# Patient Record
Sex: MALE | Race: WHITE | NOT HISPANIC OR LATINO
[De-identification: names, ages, dates, MRNs, and addresses within clinical notes are randomized per-mention and may not be internally consistent; named-entity substitution may affect disease eponyms.]

---

## 2018-01-05 ENCOUNTER — APPOINTMENT (OUTPATIENT)
Dept: ORTHOPEDIC SURGERY | Facility: CLINIC | Age: 73
End: 2018-01-05

## 2022-04-04 ENCOUNTER — RESULT REVIEW (OUTPATIENT)
Age: 77
End: 2022-04-04

## 2022-04-19 ENCOUNTER — APPOINTMENT (OUTPATIENT)
Dept: PAIN MANAGEMENT | Facility: CLINIC | Age: 77
End: 2022-04-19

## 2022-04-21 ENCOUNTER — APPOINTMENT (OUTPATIENT)
Dept: PAIN MANAGEMENT | Facility: CLINIC | Age: 77
End: 2022-04-21
Payer: MEDICARE

## 2022-04-21 VITALS — HEIGHT: 67 IN | BODY MASS INDEX: 32.96 KG/M2 | WEIGHT: 210 LBS

## 2022-04-21 DIAGNOSIS — M48.061 SPINAL STENOSIS, LUMBAR REGION WITHOUT NEUROGENIC CLAUDICATION: ICD-10-CM

## 2022-04-21 DIAGNOSIS — M51.26 OTHER INTERVERTEBRAL DISC DISPLACEMENT, LUMBAR REGION: ICD-10-CM

## 2022-04-21 DIAGNOSIS — Z86.79 PERSONAL HISTORY OF OTHER DISEASES OF THE CIRCULATORY SYSTEM: ICD-10-CM

## 2022-04-21 DIAGNOSIS — Z86.39 PERSONAL HISTORY OF OTHER ENDOCRINE, NUTRITIONAL AND METABOLIC DISEASE: ICD-10-CM

## 2022-04-21 PROCEDURE — 99204 OFFICE O/P NEW MOD 45 MIN: CPT

## 2022-04-21 RX ORDER — LANCETS 33 GAUGE
EACH MISCELLANEOUS
Qty: 200 | Refills: 0 | Status: ACTIVE | COMMUNITY
Start: 2021-02-22

## 2022-04-21 RX ORDER — ROSUVASTATIN CALCIUM 5 MG/1
5 TABLET, FILM COATED ORAL
Qty: 90 | Refills: 0 | Status: ACTIVE | COMMUNITY
Start: 2021-12-21

## 2022-04-21 RX ORDER — METOPROLOL SUCCINATE 100 MG/1
100 TABLET, EXTENDED RELEASE ORAL
Qty: 60 | Refills: 0 | Status: ACTIVE | COMMUNITY
Start: 2021-12-21

## 2022-04-21 RX ORDER — BLOOD SUGAR DIAGNOSTIC
STRIP MISCELLANEOUS
Qty: 300 | Refills: 0 | Status: ACTIVE | COMMUNITY
Start: 2021-02-22

## 2022-04-21 NOTE — HISTORY OF PRESENT ILLNESS
[Lower back] : lower back [8] : 8 [5] : 5 [Sharp] : sharp [Constant] : constant [Nothing helps with pain getting better] : Nothing helps with pain getting better [Standing] : standing [Lying in bed] : lying in bed [FreeTextEntry1] : 04/21/2022 : Patient presents for initial evaluation. He has pain in the right lower back to the right lateral thigh. Had a right THR over 20 years ago and initially went to Dr. Fonseca and the hip looked good. Has issues with the left leg since he was a child. can not bend the left knee and requires a shoe lift. no n/t. \par \par Subjective Weakness: Yes\par Numbness/Tingling: No\par Bladder/Bowel dysfunction:No\par Physical Therapy: 5 sessions with limited relief. \par \par \par Attempted modalities for current pain complaint:\par See above:\par Medications: No\par \par Injections: Yes-->unsure of the type 7-8 years ago. with improvement. \par \par Previous Spine Surgery: N/A\par \par Imaging:\par MRI Lumbar Spine (4/4/21): ZP \par T12-L1: No disc protrusion. There is no neural foraminal narrowing. There is no\par central canal stenosis. There is no facet joint arthrosis.\par L1-L2: No disc protrusion. There is no neural foraminal narrowing. There is no\par central canal stenosis. There is no facet joint arthrosis.\par L2-L3: No disc protrusion. There is no neural foraminal narrowing. There is no\par central canal stenosis. There is no facet joint arthrosis.\par L3-L4: There is a 3 to 4 mm circumferential disc bulge. There is left neural\par foraminal narrowing. There is no central canal stenosis. There is no facet joint\par arthrosis.\par L4-L5: There is a 3 to 4 mm circumferential disc bulge. There is bilateral\par neural foraminal narrowing. There is spinal canal stenosis measuring 8 mm in AP\par dimension. There is bilateral facet joint arthrosis.\par L5-S1: There is a 5 mm broad-based posterior right lateral zone disc protrusion.\par There is right neural foraminal narrowing. There is spinal canal stenosis\par measuring less than 9 mm in AP dimension. There is bilateral facet joint\par arthrosis. There is a 0.9 cm synovial cyst extending medially from the right\par facet joint with encroachment on the right transiting S1 nerve roots.\par There is a transitional lumbosacral vertebrae with lumbarization of the S1\par vertebral body.\par \par \par \par \par  [] : no [FreeTextEntry7] : right hip

## 2022-04-21 NOTE — PHYSICAL EXAM
[] : motor exam is non-focal throughout both lower extremities [TWNoteComboBox7] : forward flexion 60 degrees

## 2022-04-21 NOTE — ASSESSMENT
[FreeTextEntry1] : After discussing various treatment options with the patient including but not limited to oral medications, physical therapy, exercise, modalities as well as interventional spinal injections, we have decided with the following plan:\par I personally reviewed the MRI/CT scan images and agree with the radiologist's report. The radiological findings were discussed with the patient.\par The risks, benefits, contents and alternatives to injection were explained in full to the patient. Risks outlined include but are not limited to infection,sepsis, bleeding, post-dural puncture headache, nerve damage, temporary increase in pain, syncopal episode, failure to resolve symptoms, allergic reaction, symptom recurrence, and elevation of blood sugar in diabetics. Cortisone may cause immunosuppression. Patient understands the risks. All questions were answered. After discussion of options, patient requested an injection. Information regarding the injection was given to the patient. Which medications to stop prior to the injection was explained to the patient as well.\par Follow up in 1-2 weeks post injection for re-evaluation. \par Continue Home exercises, stretching, activity modification, physical therapy, and conservative care.\par Patient is presenting with acute/sub-acute radicular pain with impairment in ADLs and functionality. The pain has not responded to conservative care including nsaid therapy and/or physical therapy. There is no bleeding tendency, unstable medical condition, or systemic infection.\par \par Patient is presenting with acute/sub-acute radicular pain with impairment in ADLs and functionality.  The pain has not responded to  conservative care including nsaid therapy and/or physical therapy.  There is no bleeding tendency, unstable medical condition, or systemic infection.\par \par \par

## 2022-04-21 NOTE — DATA REVIEWED
[MRI] : MRI [Lumbar Spine] : lumbar spine [Report was reviewed and noted in the chart] : The report was reviewed and noted in the chart [I independently reviewed and interpreted images and report] : I independently reviewed and interpreted images and report

## 2022-04-27 ENCOUNTER — TRANSCRIPTION ENCOUNTER (OUTPATIENT)
Age: 77
End: 2022-04-27

## 2022-05-02 ENCOUNTER — APPOINTMENT (OUTPATIENT)
Dept: PAIN MANAGEMENT | Facility: CLINIC | Age: 77
End: 2022-05-02
Payer: MEDICARE

## 2022-05-02 DIAGNOSIS — M54.16 RADICULOPATHY, LUMBAR REGION: ICD-10-CM

## 2022-05-02 PROCEDURE — 64483 NJX AA&/STRD TFRM EPI L/S 1: CPT | Mod: RT

## 2022-05-02 PROCEDURE — 64484 NJX AA&/STRD TFRM EPI L/S EA: CPT | Mod: RT

## 2022-05-02 NOTE — PROCEDURE
[FreeTextEntry3] : Date of Service: 05/02/2022 \par \par Account: [id number]\par \par Patient: JAYNE MCLEOD \par \par YOB: 1945\par \par Age: 76 year\par \par \par Surgeon: Adriana Ray M.D.\par \par Assistant: None.\par \par Pre-Operative Diagnosis: Lumbosacral Radiculitis (M54.17)\par \par Post Operative Diagnosis: Lumbosacral Radiculitis (M54.17)\par \par Procedure: Right L4-5, L5-S1 transforaminal epidural steroid injection under fluoroscopic guidance.\par \par Anesthesia:             MAC\par \par \par This procedure was carried out using fluoroscopic guidance.  The risks and benefits of the procedure were discussed extensively with the patient.  The consent of the patient was obtained and the following procedure was performed. The patient was placed in the prone position on the fluoroscopic table and the lumbar area was prepped and draped in a sterile fashion.\par \par The right L4-5 and L5-S1 neural foramen were identified on right oblique  "kvng dog" anatomical view at the 6 o' clock position using fluoroscopic guidance, and the area was marked. The overlying skin and subcutaneous structures were anesthetized using sterile technique with 1% Lidocaine.  A 22 gauge spinal needle was directed toward the inferior (6o'clock) position of the pedicle, which formed the roof of the identified foramen.  Once in the epidural space, after negative aspiration for heme and CSF, 1cc of Omnipaque contrast was injected to confirm epidural location and assess filling defects and rule out intravascular needle placement. \par \par The following contrast flow and epidurogram was observed: no intravascular or intrathecal flow pattern was noted.  No blood or CSF was aspirated. Omnipaque spread appeared to outline the right L4 and L5 nerve roots and spread medially into the epidural space.  \par \par After this, an injectate of 3 cc preservative free normal saline plus 40 mg of Kenalog was injected in the epidural space at each of the two levels.\par \par The needle was subsequently removed.  Vital signs remained normal.  Pulse oximeter was used throughout the procedure and the patient's pulse and oxygen saturation remained within normal limits.  The patient tolerated the procedure well.  There were no complications.  The patient was instructed to apply ice over the injection sites for twenty minutes every two hours for the next 24 to 48 hours.  The patient was also instructed to contact me immediately if there were any problems.\par \par Adriana Ray M.D.\par \par \par

## 2022-05-25 ENCOUNTER — APPOINTMENT (OUTPATIENT)
Dept: PAIN MANAGEMENT | Facility: CLINIC | Age: 77
End: 2022-05-25

## 2022-06-01 ENCOUNTER — APPOINTMENT (OUTPATIENT)
Dept: PAIN MANAGEMENT | Facility: CLINIC | Age: 77
End: 2022-06-01
Payer: MEDICARE

## 2022-06-01 VITALS — WEIGHT: 229 LBS | BODY MASS INDEX: 35.94 KG/M2 | HEIGHT: 67 IN

## 2022-06-01 PROCEDURE — 99214 OFFICE O/P EST MOD 30 MIN: CPT

## 2022-06-01 NOTE — HISTORY OF PRESENT ILLNESS
[Lower back] : lower back [8] : 8 [5] : 5 [Sharp] : sharp [Standing] : standing [Lying in bed] : lying in bed [Intermittent] : intermittent [Injection therapy] : injection therapy [Walking] : walking [] : no [FreeTextEntry1] : hip [FreeTextEntry7] : right hip

## 2022-08-22 ENCOUNTER — APPOINTMENT (OUTPATIENT)
Dept: PAIN MANAGEMENT | Facility: CLINIC | Age: 77
End: 2022-08-22

## 2022-08-22 PROCEDURE — 82948 REAGENT STRIP/BLOOD GLUCOSE: CPT

## 2022-08-22 PROCEDURE — 64484 NJX AA&/STRD TFRM EPI L/S EA: CPT | Mod: RT

## 2022-08-22 PROCEDURE — 64483 NJX AA&/STRD TFRM EPI L/S 1: CPT | Mod: RT

## 2022-08-22 NOTE — PROCEDURE
[FreeTextEntry3] : Date of Service: 08/22/2022 \par \par Account: [id number]\par \par Patient: JAYNE MCLEOD \par \par YOB: 1945\par \par Age: 77 year\par \par \par Surgeon: Rohan Vargas M.D.\par \par Assistant: None.\par \par Pre-Operative Diagnosis: Lumbosacral radiculitis\par \par Post Operative Diagnosis: Lumbosacral radiculitis\par \par Procedure: Right L5-S1, S1 transforaminal epidural steroid injection under fluoroscopic guidance. \par \par Anesthesia:      MAC\par \par \par This procedure was carried out using fluoroscopic guidance.  The risks and benefits of the procedure were discussed extensively with the patient.  The consent of the patient was obtained and the following procedure was performed. The patient was placed in the prone position on the fluoroscopic table and the lumbar area was prepped and draped in a sterile fashion.\par \par The right L5-S1 neural foramen was then identified on right oblique  "kvng dog" anatomical view at the 6 o' clock position using fluoroscopic guidance, and the area was marked. The overlying skin and subcutaneous structures were anesthetized using sterile technique with 1% Lidocaine.  A 22 gauge spinal needle was directed toward the inferior (6 o'clock) position of the pedicle, which formed the roof of the identified foramen.  Once in the epidural space, after negative aspiration for heme and CSF, 1cc of Omnipaque contrast was injected to confirm epidural location and assess filling defects and rule out intravascular needle placement. \par \par The following contrast flow and epidurogram was observed: no intravascular or intrathecal flow pattern was noted.  No blood or CSF was aspirated. Omnipaque spread appeared to outline the right L5 nerve root and spread medially into the epidural space.  \par \par After this, an injectate of 3 cc preservative free normal saline plus 40 mg of kenalog was injected in the epidural space.\par \par The right S1 neural foramen was then identified on right oblique anatomical view at the 6 o' clock position of the S1 pedicle using fluoroscopic guidance, and the area was marked. The overlying skin and subcutaneous structures were anesthetized using sterile technique with 1% Lidocaine.  A 22 gauge spinal needle was directed toward the inferior (6 o'clock) position of the pedicle, which formed the roof of the identified foramen.  Once in the epidural space, after negative aspiration for heme and CSF, 1cc of Omnipaque contrast was injected to confirm epidural location and assess filling defects and rule out intravascular needle placement. \par \par The following contrast flow and epidurogram was observed: no intravascular or intrathecal flow pattern was noted.  No blood or CSF was aspirated. Omnipaque spread appeared to outline the right S1 nerve root and spread medially into the epidural space.  \par \par After this, an injectate of 3 cc preservative free normal saline plus 40 mg of kenalog was injected in the epidural space.\par \par The needle was subsequently removed.  Vital signs remained normal.  Pulse oximeter was used throughout the procedure and the patient's pulse and oxygen saturation remained within normal limits.  The patient tolerated the procedure well.  There were no complications.  The patient was instructed to apply ice over the injection sites for twenty minutes every two hours for the next 24 to 48 hours.  The patient was also instructed to contact me immediately if there were any problems.\par \par \par Rohan Vargas M.D.\par \par

## 2022-09-07 ENCOUNTER — APPOINTMENT (OUTPATIENT)
Dept: PAIN MANAGEMENT | Facility: CLINIC | Age: 77
End: 2022-09-07

## 2022-09-19 ENCOUNTER — APPOINTMENT (OUTPATIENT)
Dept: PAIN MANAGEMENT | Facility: CLINIC | Age: 77
End: 2022-09-19

## 2022-09-19 VITALS — HEIGHT: 67 IN | BODY MASS INDEX: 35.94 KG/M2 | WEIGHT: 229 LBS

## 2022-09-19 PROCEDURE — 99213 OFFICE O/P EST LOW 20 MIN: CPT

## 2022-09-19 NOTE — DISCUSSION/SUMMARY
[de-identified] : 90% relief  of radicular pain pp with improved ROM and ADLS \par some  pain in  lower back only when he goes to stand up /sit down \par declines TPI today will hold off and fu PRN\par

## 2022-09-19 NOTE — HISTORY OF PRESENT ILLNESS
[Lower back] : lower back [7] : 7 [3] : 3 [Dull/Aching] : dull/aching [Intermittent] : intermittent [Household chores] : household chores [Leisure] : leisure [Injection therapy] : injection therapy [Sitting] : sitting [] : no

## 2022-09-19 NOTE — PHYSICAL EXAM
[Flexion] : flexion [Extension] : extension [Bending to left] : bending to left [Bending to right] : bending to right

## 2022-09-19 NOTE — REASON FOR VISIT
[FreeTextEntry2] : f/u to injection \par Procedure: Right L5-S1,S1 transforaminal epidural steroid injection under fluoroscopic guidance \par Anesthesia:MAC(DOS:08/22/2022)

## 2022-11-01 ENCOUNTER — ASC (OUTPATIENT)
Dept: URBAN - METROPOLITAN AREA SURGERY 8 | Facility: SURGERY | Age: 77
Setting detail: OPHTHALMOLOGY
End: 2022-11-01
Payer: COMMERCIAL

## 2022-11-01 DIAGNOSIS — E11.3511: ICD-10-CM

## 2022-11-01 PROCEDURE — 67210 TREATMENT OF RETINAL LESION: CPT | Performed by: OPHTHALMOLOGY

## 2022-11-01 ASSESSMENT — TONOMETRY
OD_IOP_MMHG: 17
OS_IOP_MMHG: 16

## 2022-11-02 ASSESSMENT — REFRACTION_AUTOREFRACTION
OS_CYLINDER: -2.00
OD_SPHERE: UNABLE
OS_SPHERE: +1.00
OS_AXIS: 98

## 2022-11-02 ASSESSMENT — SPHEQUIV_DERIVED: OS_SPHEQUIV: 0

## 2022-11-02 ASSESSMENT — VISUAL ACUITY
OD_BCVA: 20/25-1
OS_BCVA: 20/50+1

## 2022-12-13 ENCOUNTER — OFFICE (OUTPATIENT)
Dept: URBAN - METROPOLITAN AREA CLINIC 94 | Facility: CLINIC | Age: 77
Setting detail: OPHTHALMOLOGY
End: 2022-12-13
Payer: COMMERCIAL

## 2022-12-13 DIAGNOSIS — E11.3513: ICD-10-CM

## 2022-12-13 DIAGNOSIS — H35.341: ICD-10-CM

## 2022-12-13 DIAGNOSIS — H43.11: ICD-10-CM

## 2022-12-13 DIAGNOSIS — H43.391: ICD-10-CM

## 2022-12-13 DIAGNOSIS — H35.3112: ICD-10-CM

## 2022-12-13 PROCEDURE — 99024 POSTOP FOLLOW-UP VISIT: CPT | Performed by: OPHTHALMOLOGY

## 2022-12-13 PROCEDURE — 92134 CPTRZ OPH DX IMG PST SGM RTA: CPT | Performed by: OPHTHALMOLOGY

## 2022-12-13 ASSESSMENT — REFRACTION_AUTOREFRACTION
OS_SPHERE: +1.00
OS_AXIS: 98
OS_CYLINDER: -2.00
OD_SPHERE: UNABLE

## 2022-12-13 ASSESSMENT — VISUAL ACUITY
OS_BCVA: 20/50
OD_BCVA: 20/25-1

## 2022-12-13 ASSESSMENT — SPHEQUIV_DERIVED: OS_SPHEQUIV: 0

## 2022-12-13 ASSESSMENT — CONFRONTATIONAL VISUAL FIELD TEST (CVF)
OS_FINDINGS: FULL
OD_FINDINGS: FULL

## 2022-12-13 ASSESSMENT — TONOMETRY
OD_IOP_MMHG: 15
OS_IOP_MMHG: 15

## 2023-01-17 ENCOUNTER — ASC (OUTPATIENT)
Dept: URBAN - METROPOLITAN AREA SURGERY 8 | Facility: SURGERY | Age: 78
Setting detail: OPHTHALMOLOGY
End: 2023-01-17
Payer: COMMERCIAL

## 2023-01-17 DIAGNOSIS — E11.3312: ICD-10-CM

## 2023-01-17 PROCEDURE — 67210 TREATMENT OF RETINAL LESION: CPT | Performed by: OPHTHALMOLOGY

## 2023-01-17 ASSESSMENT — REFRACTION_AUTOREFRACTION
OS_CYLINDER: -2.00
OD_SPHERE: UNABLE
OS_SPHERE: +1.00
OS_AXIS: 98

## 2023-01-17 ASSESSMENT — SPHEQUIV_DERIVED: OS_SPHEQUIV: 0

## 2023-01-17 ASSESSMENT — CONFRONTATIONAL VISUAL FIELD TEST (CVF)
OD_FINDINGS: FULL
OS_FINDINGS: FULL

## 2023-01-17 ASSESSMENT — TONOMETRY
OD_IOP_MMHG: 16
OS_IOP_MMHG: 14

## 2023-01-17 ASSESSMENT — VISUAL ACUITY
OS_BCVA: 20/40
OD_BCVA: 20/25+1

## 2023-03-28 ENCOUNTER — ASC (OUTPATIENT)
Dept: URBAN - METROPOLITAN AREA SURGERY 8 | Facility: SURGERY | Age: 78
Setting detail: OPHTHALMOLOGY
End: 2023-03-28
Payer: COMMERCIAL

## 2023-03-28 DIAGNOSIS — E11.3511: ICD-10-CM

## 2023-03-28 PROCEDURE — 67210 TREATMENT OF RETINAL LESION: CPT | Performed by: OPHTHALMOLOGY

## 2023-03-28 ASSESSMENT — CONFRONTATIONAL VISUAL FIELD TEST (CVF)
OD_FINDINGS: FULL
OS_FINDINGS: FULL

## 2023-03-28 ASSESSMENT — TONOMETRY
OS_IOP_MMHG: 14
OD_IOP_MMHG: 15

## 2023-03-28 ASSESSMENT — VISUAL ACUITY
OD_BCVA: 20/25-
OS_BCVA: 20/50

## 2023-03-28 ASSESSMENT — REFRACTION_AUTOREFRACTION
OD_SPHERE: UNABLE
OS_CYLINDER: -2.00
OS_AXIS: 98
OS_SPHERE: +1.00

## 2023-03-28 ASSESSMENT — SPHEQUIV_DERIVED: OS_SPHEQUIV: 0

## 2023-05-04 ENCOUNTER — OFFICE (OUTPATIENT)
Dept: URBAN - METROPOLITAN AREA CLINIC 104 | Facility: CLINIC | Age: 78
Setting detail: OPHTHALMOLOGY
End: 2023-05-04
Payer: COMMERCIAL

## 2023-05-04 DIAGNOSIS — H52.4: ICD-10-CM

## 2023-05-04 PROCEDURE — 92015 DETERMINE REFRACTIVE STATE: CPT | Performed by: OPTOMETRIST

## 2023-05-04 ASSESSMENT — KERATOMETRY
OD_K2POWER_DIOPTERS: 43.38
OS_K1POWER_DIOPTERS: 42.08
OS_K2POWER_DIOPTERS: 44.00
OS_AXISANGLE_DEGREES: 017
OD_K1POWER_DIOPTERS: 43.16
OD_AXISANGLE_DEGREES: 128

## 2023-05-04 ASSESSMENT — REFRACTION_CURRENTRX
OS_SPHERE: +2.50
OD_CYLINDER: SPHERE
OS_OVR_VA: 20/
OD_OVR_VA: 20/
OD_VPRISM_DIRECTION: SV
OS_VPRISM_DIRECTION: SV
OS_AXIS: 092
OS_CYLINDER: -0.75
OD_SPHERE: +2.50

## 2023-05-04 ASSESSMENT — REFRACTION_AUTOREFRACTION
OD_SPHERE: +0.50
OS_SPHERE: +1.25
OD_AXIS: 083
OS_CYLINDER: -1.75
OD_CYLINDER: -1.00
OS_AXIS: 102

## 2023-05-04 ASSESSMENT — CONFRONTATIONAL VISUAL FIELD TEST (CVF)
OD_FINDINGS: FULL
OS_FINDINGS: FULL

## 2023-05-04 ASSESSMENT — AXIALLENGTH_DERIVED
OD_AL: 23.6767
OS_AL: 23.61
OS_AL: 23.71

## 2023-05-04 ASSESSMENT — VISUAL ACUITY
OD_BCVA: 20/20-2
OS_BCVA: 20/60

## 2023-05-04 ASSESSMENT — SPHEQUIV_DERIVED
OD_SPHEQUIV: 0
OS_SPHEQUIV: 0.375
OS_SPHEQUIV: 0.125

## 2023-05-04 ASSESSMENT — REFRACTION_MANIFEST
OS_VA1: 20/20
OD_VA1: 20/60
OS_AXIS: 090
OD_SPHERE: PLANO
OS_CYLINDER: -1.25
OS_ADD: +2.50+
OS_SPHERE: +0.75
OD_ADD: +2.50

## 2023-05-23 ENCOUNTER — OFFICE (OUTPATIENT)
Dept: URBAN - METROPOLITAN AREA CLINIC 94 | Facility: CLINIC | Age: 78
Setting detail: OPHTHALMOLOGY
End: 2023-05-23
Payer: COMMERCIAL

## 2023-05-23 ENCOUNTER — ASC (OUTPATIENT)
Dept: URBAN - METROPOLITAN AREA SURGERY 8 | Facility: SURGERY | Age: 78
Setting detail: OPHTHALMOLOGY
End: 2023-05-23
Payer: COMMERCIAL

## 2023-05-23 DIAGNOSIS — E11.3312: ICD-10-CM

## 2023-05-23 DIAGNOSIS — E11.3513: ICD-10-CM

## 2023-05-23 DIAGNOSIS — H43.11: ICD-10-CM

## 2023-05-23 DIAGNOSIS — H35.3112: ICD-10-CM

## 2023-05-23 DIAGNOSIS — H35.341: ICD-10-CM

## 2023-05-23 PROBLEM — H52.4 PRESBYOPIA: Status: ACTIVE | Noted: 2023-05-04

## 2023-05-23 PROCEDURE — 67210 TREATMENT OF RETINAL LESION: CPT | Performed by: OPHTHALMOLOGY

## 2023-05-23 PROCEDURE — 92235 FLUORESCEIN ANGRPH MLTIFRAME: CPT | Performed by: OPHTHALMOLOGY

## 2023-05-23 PROCEDURE — 92134 CPTRZ OPH DX IMG PST SGM RTA: CPT | Performed by: OPHTHALMOLOGY

## 2023-05-23 ASSESSMENT — CONFRONTATIONAL VISUAL FIELD TEST (CVF)
OD_FINDINGS: FULL
OS_FINDINGS: FULL

## 2023-05-23 ASSESSMENT — REFRACTION_AUTOREFRACTION
OD_CYLINDER: -1.00
OD_SPHERE: +0.50
OD_AXIS: 083
OS_SPHERE: +1.25
OS_CYLINDER: -1.75
OS_AXIS: 102

## 2023-05-23 ASSESSMENT — KERATOMETRY
OS_K2POWER_DIOPTERS: 44.00
OD_K1POWER_DIOPTERS: 43.16
OD_K2POWER_DIOPTERS: 43.38
OS_K1POWER_DIOPTERS: 42.08
OS_AXISANGLE_DEGREES: 017
OD_AXISANGLE_DEGREES: 128

## 2023-05-23 ASSESSMENT — SPHEQUIV_DERIVED
OS_SPHEQUIV: 0.375
OD_SPHEQUIV: 0

## 2023-05-23 ASSESSMENT — VISUAL ACUITY
OD_BCVA: 20/20-1
OS_BCVA: 20/50

## 2023-05-23 ASSESSMENT — AXIALLENGTH_DERIVED
OD_AL: 23.6767
OS_AL: 23.61

## 2023-05-23 ASSESSMENT — TONOMETRY
OS_IOP_MMHG: 14
OD_IOP_MMHG: 15

## 2023-06-05 ENCOUNTER — APPOINTMENT (OUTPATIENT)
Dept: PAIN MANAGEMENT | Facility: CLINIC | Age: 78
End: 2023-06-05
Payer: MEDICARE

## 2023-06-05 VITALS — HEIGHT: 67 IN | BODY MASS INDEX: 36.1 KG/M2 | WEIGHT: 230 LBS

## 2023-06-05 PROCEDURE — 99214 OFFICE O/P EST MOD 30 MIN: CPT

## 2023-06-05 NOTE — DISCUSSION/SUMMARY
[Surgical risks reviewed] : Surgical risks reviewed [de-identified] : proceed R L5S1 S1 TFESI\par \par After discussing various treatment options with the patient including but not limited to oral medications, physical therapy, exercise, modalities as well as interventional spinal injections, we have decided with the following plan:\par I personally reviewed the MRI/CT scan images and agree with the radiologist's report. The radiological findings were discussed with the patient.\par The risks, benefits, contents and alternatives to injection were explained in full to the patient. Risks outlined include but are not limited to infection,sepsis, bleeding, post-dural puncture headache, nerve damage, temporary increase in pain, syncopal episode, failure to resolve symptoms, allergic reaction, symptom recurrence, and elevation of blood sugar in diabetics. Cortisone may cause immunosuppression. Patient understands the risks. All questions were answered. After discussion of options, patient requested an injection. Information regarding the injection was given to the patient. Which medications to stop prior to the injection was explained to the patient as well.\par Follow up in 1-2 weeks post injection for re-evaluation.\par Continue Home exercises, stretching, activity modification, physical therapy, and conservative care.\par Patient is presenting with acute/sub-acute radicular pain with impairment in ADLs and functionality. The pain has not responded to conservative care including nsaid therapy and/or physical therapy. There is no bleeding tendency, unstable medical condition, or systemic infection.

## 2023-06-05 NOTE — HISTORY OF PRESENT ILLNESS
[Lower back] : lower back [3] : 3 [Dull/Aching] : dull/aching [Intermittent] : intermittent [Household chores] : household chores [Leisure] : leisure [Injection therapy] : injection therapy [Sitting] : sitting [8] : 8 [Radiating] : radiating [Standing] : standing [FreeTextEntry1] : pt is following up for lower back pain, the pain goes into the right leg  [] : no

## 2023-06-05 NOTE — ASSESSMENT
[FreeTextEntry1] : prior  TFESI >60% relief improved rom and adls 8/22 sustained several months \par \par s/p  epidural steroid injection with improvement in sitting/standing tolerance, improvement in ambulation, and improvement in sleep hygiene.   Patients notes pain relief of >60 %.\par

## 2023-06-28 ENCOUNTER — APPOINTMENT (OUTPATIENT)
Dept: PAIN MANAGEMENT | Facility: CLINIC | Age: 78
End: 2023-06-28
Payer: MEDICARE

## 2023-06-28 PROCEDURE — 64483 NJX AA&/STRD TFRM EPI L/S 1: CPT | Mod: RT

## 2023-06-28 PROCEDURE — 64484 NJX AA&/STRD TFRM EPI L/S EA: CPT | Mod: RT

## 2023-06-28 NOTE — PROCEDURE
[FreeTextEntry3] : Date of Service: 06/28/2023 \par \par Account: [id number]\par \par Patient: JAYNE MCLEOD \par \par YOB: 1945\par \par Age: 78 year\par \par \par Surgeon: Rohan Vargas M.D.\par \par Assistant: None.\par \par Pre-Operative Diagnosis: Lumbosacral radiculitis\par \par Post Operative Diagnosis: Lumbosacral radiculitis\par \par Procedure: Right L5-S1, S1 transforaminal epidural steroid injection under fluoroscopic guidance. \par \par Anesthesia:      MAC\par \par \par This procedure was carried out using fluoroscopic guidance.  The risks and benefits of the procedure were discussed extensively with the patient.  The consent of the patient was obtained and the following procedure was performed. The patient was placed in the prone position on the fluoroscopic table and the lumbar area was prepped and draped in a sterile fashion.\par \par The right L5-S1 neural foramen was then identified on right oblique  "kvng dog" anatomical view at the 6 o' clock position using fluoroscopic guidance, and the area was marked. The overlying skin and subcutaneous structures were anesthetized using sterile technique with 1% Lidocaine.  A 22 gauge spinal needle was directed toward the inferior (6 o'clock) position of the pedicle, which formed the roof of the identified foramen.  Once in the epidural space, after negative aspiration for heme and CSF, 1cc of Omnipaque contrast was injected to confirm epidural location and assess filling defects and rule out intravascular needle placement. \par \par The following contrast flow and epidurogram was observed: no intravascular or intrathecal flow pattern was noted.  No blood or CSF was aspirated. Omnipaque spread appeared to outline the right L5 nerve root and spread medially into the epidural space.  \par \par After this, an injectate of 3 cc preservative free normal saline plus 40 mg of kenalog was injected in the epidural space.\par \par The right S1 neural foramen was then identified on right oblique anatomical view at the 6 o' clock position of the S1 pedicle using fluoroscopic guidance, and the area was marked. The overlying skin and subcutaneous structures were anesthetized using sterile technique with 1% Lidocaine.  A 22 gauge spinal needle was directed toward the inferior (6 o'clock) position of the pedicle, which formed the roof of the identified foramen.  Once in the epidural space, after negative aspiration for heme and CSF, 1cc of Omnipaque contrast was injected to confirm epidural location and assess filling defects and rule out intravascular needle placement. \par \par The following contrast flow and epidurogram was observed: no intravascular or intrathecal flow pattern was noted.  No blood or CSF was aspirated. Omnipaque spread appeared to outline the right S1 nerve root and spread medially into the epidural space.  \par \par After this, an injectate of 3 cc preservative free normal saline plus 40 mg of kenalog was injected in the epidural space.\par \par The needle was subsequently removed.  Vital signs remained normal.  Pulse oximeter was used throughout the procedure and the patient's pulse and oxygen saturation remained within normal limits.  The patient tolerated the procedure well.  There were no complications.  The patient was instructed to apply ice over the injection sites for twenty minutes every two hours for the next 24 to 48 hours.  The patient was also instructed to contact me immediately if there were any problems.\par \par \par Rohan Vargas M.D.\par \par

## 2023-07-17 ENCOUNTER — APPOINTMENT (OUTPATIENT)
Dept: PAIN MANAGEMENT | Facility: CLINIC | Age: 78
End: 2023-07-17
Payer: MEDICARE

## 2023-07-17 VITALS — WEIGHT: 230 LBS | BODY MASS INDEX: 36.1 KG/M2 | HEIGHT: 67 IN

## 2023-07-17 PROCEDURE — 99213 OFFICE O/P EST LOW 20 MIN: CPT

## 2023-07-17 NOTE — HISTORY OF PRESENT ILLNESS
[Lower back] : lower back [Dull/Aching] : dull/aching [Radiating] : radiating [Injection therapy] : injection therapy [Sitting] : sitting [Standing] : standing [1] : 2 [Occasional] : occasional [FreeTextEntry1] : 07/17/2023 : s/p RIGHT L5-S1 , S1 TFESI on  06/28/23 with 50% relief and improvement of ADLS \par \par pt is following up for lower back pain, the pain goes into the right leg  [] : no [FreeTextEntry7] : RIGHT HIP

## 2023-07-17 NOTE — ASSESSMENT
[FreeTextEntry1] : TFESI WITH >95% RELIEF OF PAIN IMPROVED ROM AND ADLS\par SOME SORENESS OVER R BURSA , DECLINES INJECTION\par WILL MONITOR

## 2023-07-17 NOTE — PHYSICAL EXAM
[de-identified] : PHYSICAL EXAM\par \par Constitutional: \par Appears well, no apparent distress\par Ability to communicate: Normal\par Respiratory: non-labored breathing\par Skin: no rash noted\par Head: normocephalic, atraumatic\par Neck: no visible thyroid enlargement\par Eyes: extraocular movements intact\par Neurologic: alert and oriented x3\par Psychiatric: normal mood, affect, and behavior\par \par \par Back, including spine: Range of motion of the thoracic and lumbar spine is as follows: Diminished range of motion in all planes.  MMT 5/5 BL LE.  Sensation is intact to light touch and pin prick BL LE.  Negative Straight leg raise testing bilaterally.  Negatvie Kemps maneuver bilaterally.  Normal Gait.\par \par \par Assessment:\par Lumbago\par \par Plan:\par After discussing various treatment options with the patient including but not limited to oral medications, physical therapy, exercise modalities as well as interventional spinal injections, we have decided with the following plan:\par  \par Continue home exercises, stretching, activity modification, physical therapy, and conservative care.\par \par \par

## 2023-08-30 ENCOUNTER — APPOINTMENT (OUTPATIENT)
Dept: CARDIOLOGY | Facility: CLINIC | Age: 78
End: 2023-08-30
Payer: MEDICARE

## 2023-08-30 VITALS
HEART RATE: 79 BPM | BODY MASS INDEX: 31.39 KG/M2 | DIASTOLIC BLOOD PRESSURE: 73 MMHG | HEIGHT: 67 IN | RESPIRATION RATE: 16 BRPM | SYSTOLIC BLOOD PRESSURE: 157 MMHG | WEIGHT: 200 LBS

## 2023-08-30 DIAGNOSIS — T73.3XXA EXHAUSTION DUE TO EXCESSIVE EXERTION, INITIAL ENCOUNTER: ICD-10-CM

## 2023-08-30 DIAGNOSIS — Z86.79 PERSONAL HISTORY OF OTHER DISEASES OF THE CIRCULATORY SYSTEM: ICD-10-CM

## 2023-08-30 DIAGNOSIS — Z72.3 LACK OF PHYSICAL EXERCISE: ICD-10-CM

## 2023-08-30 DIAGNOSIS — Z78.9 OTHER SPECIFIED HEALTH STATUS: ICD-10-CM

## 2023-08-30 PROCEDURE — 93000 ELECTROCARDIOGRAM COMPLETE: CPT

## 2023-08-30 PROCEDURE — 99204 OFFICE O/P NEW MOD 45 MIN: CPT | Mod: 25

## 2023-08-30 RX ORDER — METOPROLOL TARTRATE 100 MG/1
100 TABLET, FILM COATED ORAL
Refills: 0 | Status: DISCONTINUED | COMMUNITY
End: 2023-08-30

## 2023-08-30 RX ORDER — EMPAGLIFLOZIN 10 MG/1
10 TABLET, FILM COATED ORAL DAILY
Refills: 0 | Status: ACTIVE | COMMUNITY

## 2023-08-30 RX ORDER — ROSUVASTATIN CALCIUM 10 MG/1
10 TABLET, FILM COATED ORAL
Refills: 0 | Status: DISCONTINUED | COMMUNITY
End: 2023-08-30

## 2023-08-30 RX ORDER — GLIMEPIRIDE 2 MG/1
2 TABLET ORAL DAILY
Refills: 0 | Status: ACTIVE | COMMUNITY
Start: 2022-03-24

## 2023-08-30 RX ORDER — OMEPRAZOLE 40 MG/1
40 CAPSULE, DELAYED RELEASE ORAL
Qty: 90 | Refills: 0 | Status: ACTIVE | COMMUNITY

## 2023-08-30 RX ORDER — MULTIVIT-MIN/FA/LYCOPEN/LUTEIN .4-300-25
TABLET ORAL
Refills: 0 | Status: ACTIVE | COMMUNITY

## 2023-08-30 RX ORDER — CHOLECALCIFEROL (VITAMIN D3) 50 MCG
TABLET ORAL
Refills: 0 | Status: ACTIVE | COMMUNITY

## 2023-08-30 RX ORDER — METFORMIN HYDROCHLORIDE 1000 MG/1
1000 TABLET, COATED ORAL
Refills: 0 | Status: DISCONTINUED | COMMUNITY
End: 2023-08-30

## 2023-08-30 RX ORDER — AMLODIPINE BESYLATE 10 MG/1
10 TABLET ORAL
Refills: 0 | Status: DISCONTINUED | COMMUNITY
End: 2023-08-30

## 2023-08-30 NOTE — ASSESSMENT
[FreeTextEntry1] : 78M with hx of HTN, HLD, carotid disease who comes to the office to reestablish cardiovascular care.  Patient reports today occasional fatigue with exerting. Patient denies chest pain, no palpitations, no CROWLEY, no PND, no orthopnea, no leg edema,  no claudication, no syncope.  #Fatigue  will proceed with TTE   #Carotid disease  continue with statin   #HTN/DM managed by PMD  RTC post testing  I appreciate the opportunity of working with you in the care of JAYNE MCLEOD . If I may be of additional assistance, please do not hesitate to contact me.

## 2023-08-30 NOTE — HISTORY OF PRESENT ILLNESS
[FreeTextEntry1] : 78M with hx of HTN, HLD, carotid disease who comes to the office to reestablish cardiovascular care.  Patient reports today occasional fatigue with exerting. Patient denies chest pain, no palpitations, no CROWLEY, no PND, no orthopnea, no leg edema,  no claudication, no syncope.

## 2023-09-12 ENCOUNTER — OFFICE (OUTPATIENT)
Dept: URBAN - METROPOLITAN AREA CLINIC 94 | Facility: CLINIC | Age: 78
Setting detail: OPHTHALMOLOGY
End: 2023-09-12
Payer: COMMERCIAL

## 2023-09-12 ENCOUNTER — ASC (OUTPATIENT)
Dept: URBAN - METROPOLITAN AREA SURGERY 8 | Facility: SURGERY | Age: 78
Setting detail: OPHTHALMOLOGY
End: 2023-09-12
Payer: COMMERCIAL

## 2023-09-12 DIAGNOSIS — H43.11: ICD-10-CM

## 2023-09-12 DIAGNOSIS — H35.3112: ICD-10-CM

## 2023-09-12 DIAGNOSIS — H43.391: ICD-10-CM

## 2023-09-12 DIAGNOSIS — H52.4: ICD-10-CM

## 2023-09-12 DIAGNOSIS — E11.3312: ICD-10-CM

## 2023-09-12 DIAGNOSIS — E11.3513: ICD-10-CM

## 2023-09-12 DIAGNOSIS — H35.341: ICD-10-CM

## 2023-09-12 PROCEDURE — 67210 TREATMENT OF RETINAL LESION: CPT | Performed by: OPHTHALMOLOGY

## 2023-09-12 PROCEDURE — 92134 CPTRZ OPH DX IMG PST SGM RTA: CPT | Performed by: OPHTHALMOLOGY

## 2023-09-12 ASSESSMENT — REFRACTION_AUTOREFRACTION
OS_AXIS: 102
OD_CYLINDER: -1.00
OS_CYLINDER: -1.75
OD_AXIS: 083
OS_SPHERE: +1.25
OD_SPHERE: +0.50

## 2023-09-12 ASSESSMENT — SPHEQUIV_DERIVED
OS_SPHEQUIV: 0.375
OD_SPHEQUIV: 0

## 2023-09-12 ASSESSMENT — KERATOMETRY
OD_K2POWER_DIOPTERS: 43.38
OD_K1POWER_DIOPTERS: 43.16
OS_K1POWER_DIOPTERS: 42.08
OS_K2POWER_DIOPTERS: 44.00
OS_AXISANGLE_DEGREES: 017
OD_AXISANGLE_DEGREES: 128

## 2023-09-12 ASSESSMENT — AXIALLENGTH_DERIVED
OS_AL: 23.61
OD_AL: 23.6767

## 2023-09-12 ASSESSMENT — VISUAL ACUITY
OD_BCVA: 20/25+1
OS_BCVA: 20/50

## 2023-09-12 ASSESSMENT — CONFRONTATIONAL VISUAL FIELD TEST (CVF)
OD_FINDINGS: FULL
OS_FINDINGS: FULL

## 2023-09-26 ENCOUNTER — NON-APPOINTMENT (OUTPATIENT)
Age: 78
End: 2023-09-26

## 2023-10-09 ENCOUNTER — APPOINTMENT (OUTPATIENT)
Dept: CARDIOLOGY | Facility: CLINIC | Age: 78
End: 2023-10-09
Payer: MEDICARE

## 2023-10-09 PROCEDURE — 93306 TTE W/DOPPLER COMPLETE: CPT

## 2023-11-09 ENCOUNTER — APPOINTMENT (OUTPATIENT)
Dept: CARDIOLOGY | Facility: CLINIC | Age: 78
End: 2023-11-09
Payer: MEDICARE

## 2023-11-09 VITALS
BODY MASS INDEX: 31.86 KG/M2 | HEART RATE: 79 BPM | RESPIRATION RATE: 16 BRPM | SYSTOLIC BLOOD PRESSURE: 150 MMHG | OXYGEN SATURATION: 97 % | DIASTOLIC BLOOD PRESSURE: 70 MMHG | HEIGHT: 67 IN | WEIGHT: 203 LBS

## 2023-11-09 DIAGNOSIS — I34.0 NONRHEUMATIC MITRAL (VALVE) INSUFFICIENCY: ICD-10-CM

## 2023-11-09 PROCEDURE — 99214 OFFICE O/P EST MOD 30 MIN: CPT

## 2023-11-09 RX ORDER — LOSARTAN POTASSIUM 100 MG/1
100 TABLET, FILM COATED ORAL
Qty: 90 | Refills: 1 | Status: ACTIVE | COMMUNITY
Start: 2022-01-18 | End: 1900-01-01

## 2023-12-28 ENCOUNTER — APPOINTMENT (OUTPATIENT)
Dept: CARDIOLOGY | Facility: CLINIC | Age: 78
End: 2023-12-28
Payer: MEDICARE

## 2023-12-28 ENCOUNTER — NON-APPOINTMENT (OUTPATIENT)
Age: 78
End: 2023-12-28

## 2023-12-28 VITALS
BODY MASS INDEX: 31.71 KG/M2 | DIASTOLIC BLOOD PRESSURE: 74 MMHG | HEART RATE: 80 BPM | WEIGHT: 202 LBS | RESPIRATION RATE: 16 BRPM | OXYGEN SATURATION: 96 % | SYSTOLIC BLOOD PRESSURE: 156 MMHG | HEIGHT: 67 IN

## 2023-12-28 PROCEDURE — 93000 ELECTROCARDIOGRAM COMPLETE: CPT

## 2023-12-28 PROCEDURE — 99214 OFFICE O/P EST MOD 30 MIN: CPT | Mod: 25

## 2023-12-28 NOTE — ASSESSMENT
[FreeTextEntry1] : 78M with hx of HTN, HLD, Grade II diastolic dysfunction, carotid disease, On last visit patient had up titration of losartan, patient is here to assess BP post modifications on dosages.  Most recently with issues related to back pain, will need spine lidocaine injection in mid january.  Patient reports today occasional fatigue with exerting. Patient denies chest pain, no palpitations, no CROWLEY, no PND, no orthopnea, no leg edema, no claudication, no syncope.  #Fatigue/ Diastolic dysfunction/ HTN  Grade II diastolic dysfunction noted on TTE on jardiance. BP semi-controlled, possibly related to back pain will continue current therapy BMP ordered.   #Mild MR Will continue with surveillance every year with TTE.   #Carotid disease continue with statin    RTC 3 months

## 2023-12-28 NOTE — CARDIOLOGY SUMMARY
[de-identified] : 8/30/23: NSR 12/28/23: NSR [de-identified] : TTE 10/9/23: 1. Normal biventricular systolic function. 2. There is moderate (grade 2) left ventricular diastolic dysfunction, with elevated filling pressure. 3. The left atrium is moderately dilated in size.4. The right atrium is dilated in size. 5. Mild mitral regurgitation.

## 2023-12-28 NOTE — HISTORY OF PRESENT ILLNESS
[FreeTextEntry1] : 78M with hx of HTN, HLD, Grade II diastolic dysfunction, carotid disease, On last visit patient had up titration of losartan, patient is here to assess BP post modifications on dosages.  Most recently with issues related to back pain, will need spine lidocaine injection in mid january.  Patient reports today occasional fatigue with exerting. Patient denies chest pain, no palpitations, no CROWLEY, no PND, no orthopnea, no leg edema, no claudication, no syncope.

## 2024-01-09 ENCOUNTER — OFFICE (OUTPATIENT)
Dept: URBAN - METROPOLITAN AREA CLINIC 94 | Facility: CLINIC | Age: 79
Setting detail: OPHTHALMOLOGY
End: 2024-01-09
Payer: COMMERCIAL

## 2024-01-09 DIAGNOSIS — H52.4: ICD-10-CM

## 2024-01-09 DIAGNOSIS — H43.391: ICD-10-CM

## 2024-01-09 DIAGNOSIS — E11.3312: ICD-10-CM

## 2024-01-09 DIAGNOSIS — H35.341: ICD-10-CM

## 2024-01-09 DIAGNOSIS — H35.3112: ICD-10-CM

## 2024-01-09 DIAGNOSIS — E11.3511: ICD-10-CM

## 2024-01-09 DIAGNOSIS — H43.11: ICD-10-CM

## 2024-01-09 PROCEDURE — 92012 INTRM OPH EXAM EST PATIENT: CPT | Performed by: OPHTHALMOLOGY

## 2024-01-09 PROCEDURE — 92235 FLUORESCEIN ANGRPH MLTIFRAME: CPT | Performed by: OPHTHALMOLOGY

## 2024-01-09 PROCEDURE — 92134 CPTRZ OPH DX IMG PST SGM RTA: CPT | Performed by: OPHTHALMOLOGY

## 2024-01-09 ASSESSMENT — REFRACTION_AUTOREFRACTION
OS_CYLINDER: -1.75
OD_CYLINDER: -1.00
OS_AXIS: 102
OD_AXIS: 083
OS_SPHERE: +1.25
OD_SPHERE: +0.50

## 2024-01-09 ASSESSMENT — CONFRONTATIONAL VISUAL FIELD TEST (CVF)
OD_FINDINGS: FULL
OS_FINDINGS: FULL

## 2024-01-09 ASSESSMENT — SPHEQUIV_DERIVED
OS_SPHEQUIV: 0.375
OD_SPHEQUIV: 0

## 2024-01-23 ENCOUNTER — OFFICE (OUTPATIENT)
Dept: URBAN - METROPOLITAN AREA CLINIC 94 | Facility: CLINIC | Age: 79
Setting detail: OPHTHALMOLOGY
End: 2024-01-23
Payer: COMMERCIAL

## 2024-01-23 DIAGNOSIS — E11.3511: ICD-10-CM

## 2024-01-23 PROCEDURE — 67210 TREATMENT OF RETINAL LESION: CPT | Mod: RT | Performed by: OPHTHALMOLOGY

## 2024-01-23 ASSESSMENT — SPHEQUIV_DERIVED
OS_SPHEQUIV: 0.375
OD_SPHEQUIV: 0

## 2024-01-23 ASSESSMENT — REFRACTION_AUTOREFRACTION
OD_AXIS: 083
OS_SPHERE: +1.25
OS_CYLINDER: -1.75
OD_CYLINDER: -1.00
OD_SPHERE: +0.50
OS_AXIS: 102

## 2024-01-23 ASSESSMENT — CONFRONTATIONAL VISUAL FIELD TEST (CVF)
OS_FINDINGS: FULL
OD_FINDINGS: FULL

## 2024-02-06 ENCOUNTER — ASC (OUTPATIENT)
Dept: URBAN - METROPOLITAN AREA SURGERY 8 | Facility: SURGERY | Age: 79
Setting detail: OPHTHALMOLOGY
End: 2024-02-06
Payer: COMMERCIAL

## 2024-02-06 DIAGNOSIS — E11.3312: ICD-10-CM

## 2024-02-06 PROCEDURE — 67210 TREATMENT OF RETINAL LESION: CPT | Mod: 79,LT | Performed by: OPHTHALMOLOGY

## 2024-02-06 ASSESSMENT — REFRACTION_AUTOREFRACTION
OS_AXIS: 102
OD_SPHERE: +0.50
OD_AXIS: 083
OS_CYLINDER: -1.75
OS_SPHERE: +1.25
OD_CYLINDER: -1.00

## 2024-02-06 ASSESSMENT — SPHEQUIV_DERIVED
OS_SPHEQUIV: 0.375
OD_SPHEQUIV: 0

## 2024-02-06 ASSESSMENT — CONFRONTATIONAL VISUAL FIELD TEST (CVF)
OD_FINDINGS: FULL
OS_FINDINGS: FULL

## 2024-03-28 ENCOUNTER — APPOINTMENT (OUTPATIENT)
Dept: CARDIOLOGY | Facility: CLINIC | Age: 79
End: 2024-03-28
Payer: MEDICARE

## 2024-03-28 ENCOUNTER — NON-APPOINTMENT (OUTPATIENT)
Age: 79
End: 2024-03-28

## 2024-03-28 VITALS
HEART RATE: 74 BPM | WEIGHT: 196 LBS | HEIGHT: 67 IN | RESPIRATION RATE: 15 BRPM | SYSTOLIC BLOOD PRESSURE: 140 MMHG | BODY MASS INDEX: 30.76 KG/M2 | OXYGEN SATURATION: 95 % | DIASTOLIC BLOOD PRESSURE: 90 MMHG

## 2024-03-28 DIAGNOSIS — I10 ESSENTIAL (PRIMARY) HYPERTENSION: ICD-10-CM

## 2024-03-28 DIAGNOSIS — I50.30 UNSPECIFIED DIASTOLIC (CONGESTIVE) HEART FAILURE: ICD-10-CM

## 2024-03-28 DIAGNOSIS — I65.29 OCCLUSION AND STENOSIS OF UNSPECIFIED CAROTID ARTERY: ICD-10-CM

## 2024-03-28 PROCEDURE — 93000 ELECTROCARDIOGRAM COMPLETE: CPT

## 2024-03-28 PROCEDURE — 99214 OFFICE O/P EST MOD 30 MIN: CPT

## 2024-03-28 RX ORDER — AMLODIPINE BESYLATE 10 MG/1
10 TABLET ORAL
Qty: 90 | Refills: 1 | Status: DISCONTINUED | COMMUNITY
Start: 2022-01-30 | End: 2024-03-28

## 2024-03-28 RX ORDER — MECLIZINE HYDROCHLORIDE 12.5 MG/1
12.5 TABLET ORAL
Refills: 0 | Status: DISCONTINUED | COMMUNITY
End: 2024-03-28

## 2024-03-28 RX ORDER — METFORMIN HYDROCHLORIDE 1000 MG/1
1000 TABLET, COATED ORAL
Refills: 0 | Status: ACTIVE | COMMUNITY
Start: 2022-03-14

## 2024-03-28 NOTE — HISTORY OF PRESENT ILLNESS
[FreeTextEntry1] : 78M with hx of HTN, HLD, Grade II diastolic dysfunction, carotid disease, Patient comes to the office for a follow up visit.  Patient denies chest pain, no palpitations, no CROWLEY, no PND, no orthopnea, no leg edema, no claudication, no syncope.

## 2024-03-28 NOTE — CARDIOLOGY SUMMARY
[de-identified] : 8/30/23: NSR 12/28/23: NSR 3/28/24: NSR [de-identified] : TTE 10/9/23: 1. Normal biventricular systolic function. 2. There is moderate (grade 2) left ventricular diastolic dysfunction, with elevated filling pressure. 3. The left atrium is moderately dilated in size.4. The right atrium is dilated in size. 5. Mild mitral regurgitation.

## 2024-03-28 NOTE — ASSESSMENT
[FreeTextEntry1] : 78M with hx of HTN, HLD, Grade II diastolic dysfunction, carotid disease, Patient comes to the office for a follow up visit.  Patient denies chest pain, no palpitations, no CROWLEY, no PND, no orthopnea, no leg edema, no claudication, no syncope.  #Fatigue/ Diastolic dysfunction/ HTN  Grade II diastolic dysfunction noted on TTE on jardiance. BP t goal will continue current therapy BMP ordered.   #Mild MR Will continue with surveillance every year with TTE.   #Carotid disease continue with statin  RTC 6 months

## 2024-03-28 NOTE — PHYSICAL EXAM
[Well Developed] : well developed [No Acute Distress] : no acute distress [Well Nourished] : well nourished [Normal Conjunctiva] : normal conjunctiva [Normal Venous Pressure] : normal venous pressure [No Carotid Bruit] : no carotid bruit [No Murmur] : no murmur [Normal S1, S2] : normal S1, S2 [No Rub] : no rub [No Gallop] : no gallop [Clear Lung Fields] : clear lung fields [Good Air Entry] : good air entry [Soft] : abdomen soft [No Respiratory Distress] : no respiratory distress  [Non Tender] : non-tender [No Masses/organomegaly] : no masses/organomegaly [Normal Bowel Sounds] : normal bowel sounds [Normal Gait] : normal gait [No Edema] : no edema [No Cyanosis] : no cyanosis [No Clubbing] : no clubbing [No Varicosities] : no varicosities [No Rash] : no rash [No Skin Lesions] : no skin lesions [No Focal Deficits] : no focal deficits [Moves all extremities] : moves all extremities [Normal Speech] : normal speech [Alert and Oriented] : alert and oriented [Normal memory] : normal memory

## 2024-06-04 ENCOUNTER — OFFICE (OUTPATIENT)
Dept: URBAN - METROPOLITAN AREA CLINIC 94 | Facility: CLINIC | Age: 79
Setting detail: OPHTHALMOLOGY
End: 2024-06-04
Payer: COMMERCIAL

## 2024-06-04 DIAGNOSIS — H43.11: ICD-10-CM

## 2024-06-04 DIAGNOSIS — E11.3312: ICD-10-CM

## 2024-06-04 DIAGNOSIS — H43.391: ICD-10-CM

## 2024-06-04 DIAGNOSIS — H35.341: ICD-10-CM

## 2024-06-04 DIAGNOSIS — E11.3513: ICD-10-CM

## 2024-06-04 DIAGNOSIS — H35.3112: ICD-10-CM

## 2024-06-04 PROCEDURE — 92134 CPTRZ OPH DX IMG PST SGM RTA: CPT | Performed by: OPHTHALMOLOGY

## 2024-06-04 PROCEDURE — 92012 INTRM OPH EXAM EST PATIENT: CPT | Mod: 57 | Performed by: OPHTHALMOLOGY

## 2024-06-04 PROCEDURE — 67210 TREATMENT OF RETINAL LESION: CPT | Mod: LT | Performed by: OPHTHALMOLOGY

## 2024-06-04 PROCEDURE — 92235 FLUORESCEIN ANGRPH MLTIFRAME: CPT | Performed by: OPHTHALMOLOGY

## 2024-06-04 ASSESSMENT — CONFRONTATIONAL VISUAL FIELD TEST (CVF)
OS_FINDINGS: FULL
OD_FINDINGS: CONSTRICTION

## 2024-06-29 ENCOUNTER — OFFICE (OUTPATIENT)
Dept: URBAN - METROPOLITAN AREA CLINIC 94 | Facility: CLINIC | Age: 79
Setting detail: OPHTHALMOLOGY
End: 2024-06-29
Payer: COMMERCIAL

## 2024-06-29 DIAGNOSIS — H35.341: ICD-10-CM

## 2024-06-29 DIAGNOSIS — E11.3312: ICD-10-CM

## 2024-06-29 PROCEDURE — 92134 CPTRZ OPH DX IMG PST SGM RTA: CPT | Performed by: OPHTHALMOLOGY

## 2024-06-29 PROCEDURE — 67028 INJECTION EYE DRUG: CPT | Mod: 58,LT | Performed by: OPHTHALMOLOGY

## 2024-06-29 ASSESSMENT — CONFRONTATIONAL VISUAL FIELD TEST (CVF)
OD_FINDINGS: FULL
OS_FINDINGS: FULL

## 2024-07-25 ENCOUNTER — NON-APPOINTMENT (OUTPATIENT)
Age: 79
End: 2024-07-25

## 2024-07-26 ENCOUNTER — APPOINTMENT (OUTPATIENT)
Dept: CARDIOLOGY | Facility: CLINIC | Age: 79
End: 2024-07-26
Payer: MEDICARE

## 2024-07-26 ENCOUNTER — NON-APPOINTMENT (OUTPATIENT)
Age: 79
End: 2024-07-26

## 2024-07-26 VITALS
RESPIRATION RATE: 16 BRPM | HEIGHT: 67 IN | WEIGHT: 194 LBS | SYSTOLIC BLOOD PRESSURE: 162 MMHG | DIASTOLIC BLOOD PRESSURE: 76 MMHG | BODY MASS INDEX: 30.45 KG/M2 | HEART RATE: 114 BPM

## 2024-07-26 VITALS — SYSTOLIC BLOOD PRESSURE: 170 MMHG | DIASTOLIC BLOOD PRESSURE: 80 MMHG

## 2024-07-26 DIAGNOSIS — R53.82 CHRONIC FATIGUE, UNSPECIFIED: ICD-10-CM

## 2024-07-26 DIAGNOSIS — E11.9 TYPE 2 DIABETES MELLITUS W/OUT COMPLICATIONS: ICD-10-CM

## 2024-07-26 DIAGNOSIS — R94.31 ABNORMAL ELECTROCARDIOGRAM [ECG] [EKG]: ICD-10-CM

## 2024-07-26 DIAGNOSIS — I48.91 UNSPECIFIED ATRIAL FIBRILLATION: ICD-10-CM

## 2024-07-26 DIAGNOSIS — I50.30 UNSPECIFIED DIASTOLIC (CONGESTIVE) HEART FAILURE: ICD-10-CM

## 2024-07-26 DIAGNOSIS — I10 ESSENTIAL (PRIMARY) HYPERTENSION: ICD-10-CM

## 2024-07-26 PROCEDURE — 93000 ELECTROCARDIOGRAM COMPLETE: CPT

## 2024-07-26 PROCEDURE — 99215 OFFICE O/P EST HI 40 MIN: CPT

## 2024-07-26 RX ORDER — AMLODIPINE BESYLATE 10 MG/1
10 TABLET ORAL
Qty: 30 | Refills: 0 | Status: ACTIVE | COMMUNITY
Start: 2024-07-26

## 2024-07-26 RX ORDER — APIXABAN 5 MG/1
5 TABLET, FILM COATED ORAL
Qty: 180 | Refills: 1 | Status: ACTIVE | COMMUNITY
Start: 2024-07-26 | End: 1900-01-01

## 2024-07-26 RX ORDER — MELOXICAM 15 MG/1
15 TABLET ORAL
Refills: 0 | Status: ACTIVE | COMMUNITY

## 2024-07-26 RX ORDER — AMLODIPINE AND ATORVASTATIN 10; 20 MG/1; MG/1
10-20 TABLET, COATED ORAL DAILY
Refills: 0 | Status: DISCONTINUED | COMMUNITY

## 2024-07-26 NOTE — PHYSICAL EXAM
[Well Developed] : well developed [No Acute Distress] : no acute distress [Normal Conjunctiva] : normal conjunctiva [Normal Venous Pressure] : normal venous pressure [No Carotid Bruit] : no carotid bruit [No Murmur] : no murmur [No Rub] : no rub [No Gallop] : no gallop [Clear Lung Fields] : clear lung fields [Good Air Entry] : good air entry [No Respiratory Distress] : no respiratory distress  [Soft] : abdomen soft [Non Tender] : non-tender [No Masses/organomegaly] : no masses/organomegaly [No Edema] : no edema [No Cyanosis] : no cyanosis [No Clubbing] : no clubbing [No Rash] : no rash [No Skin Lesions] : no skin lesions [Moves all extremities] : moves all extremities [No Focal Deficits] : no focal deficits [Normal Speech] : normal speech [Alert and Oriented] : alert and oriented [Normal memory] : normal memory [Obese] : obese [de-identified] : irregularly irregular with elevated heart rate [de-identified] : ambulates with limp.  Immobile LLE

## 2024-07-26 NOTE — CARDIOLOGY SUMMARY
[de-identified] :  8/30/23: NSR 12/28/23: NSR 3/28/24: NSR 7/26/24: New Onset Atrial Fibrillation with RVR [de-identified] : TTE 10/9/23: 1. Normal biventricular systolic function. 2. There is moderate (grade 2) left ventricular diastolic dysfunction, with elevated filling pressure. 3. The left atrium is moderately dilated in size.4. The right atrium is dilated in size. 5. Mild mitral regurgitation.

## 2024-07-26 NOTE — ASSESSMENT
[FreeTextEntry1] : 79M with hx of HTN, HLD, Grade II diastolic dysfunction, carotid disease, recently uncontrolled diabetes, moderately dilated left atrium.  Patient comes to the office for a follow up visit after being found to have New Onset Atrial Fibrillation at his PCP's office yesterday (Dr. Avila).  He was at his PCP's office yesterday regarding a clearance for an upcoming (MILD) lumbar spinal stenosis procedure tentatively scheduled for August 14th.   He was recommended to follow up with Cardiology prior to being given clearance for surgery.  Patient presents in-office today with his daughter and son who are translating.  Patient admits to feeling more fatigued these last few days, however, he denies associated symptoms such as exertional chest pain, no palpitations, no CROWLEY, no PND, no orthopnea, no leg edema, no claudication, no syncope.  After further discussion, it appears patient has not had an ischemic workup for over 10 years.  His diabetes has apparently been uncontrolled over the last many months with HgA1C >9%, but has since improved with most recent labs reportedly around HgA1C 7%.    Patient reports compliance with taking all hypertension medications daily despite blood pressures running in the 140s to 170s systolic.  Currently takes Amlodipine 10 mg QD, Losartan 100 mg QD, and Metoprolol Succinate 100 mg in the P.M.    Most recent labs from (1/20/2024):  Creatinine 0.70, BUN 30, Glucose 169.    #New Onset AF with RVR/Fatigue  Start A/C for thromboembolic prophylaxis (2 boxes Eliquis 5 mg BID samples given) Rx sent to pharm.  Increase Metoprolol Succinate 100 mg to twice daily dosing Applied 24-hour Holter monitor today to further assess heart rates and AF burden.  Recommend CLARISA guided DCCV at [facility] in near future (Dr. Danielle notified).    Recommend ischemic workup including pharm NST.  Recommend updated TTE to assess overall cardiac function/structure.  may stop ASA until further notice.   #Spine surgery Will be postponed until further notice.  Patient must be cardiovascularly stable.    #Diastolic dysfunction/ HTN Grade II diastolic dysfunction noted on TTE on Jardiance. Repeat TTE ordered Beta blocker increased to BID dosing.  DASH diet encouraged.  Increase daily water intake.  Decrease daily espressos to 1-2 daily (was 4 daily).    #Non-insulin dependent diabetes Follow closely with PCP regarding management.  Routine labs drawn.    #Carotid disease continue with statin  RTC with Dr. Danielle shortly after testing/procedure or PRN.

## 2024-07-26 NOTE — HISTORY OF PRESENT ILLNESS
[FreeTextEntry1] : 79M with hx of HTN, HLD, Grade II diastolic dysfunction, carotid disease, recently uncontrolled diabetes, moderately dilated left atrium.  Patient comes to the office for a follow up visit after being found to have New Onset Atrial Fibrillation at his PCP's office yesterday (Dr. Avila).  He was at his PCP's office yesterday regarding a clearance for an upcoming (MILD) lumbar spinal stenosis procedure tentatively scheduled for August 14th.   He was recommended to follow up with Cardiology prior to being given clearance for surgery.  Patient presents in-office today with his daughter and son who are translating.  Patient admits to feeling more fatigued these last few days, however, he denies associated symptoms such as exertional chest pain, no palpitations, no CROWLEY, no PND, no orthopnea, no leg edema, no claudication, no syncope.  After further discussion, it appears patient has not had an ischemic workup for over 10 years.  His diabetes has apparently been uncontrolled over the last many months with HgA1C >9%, but has since improved with most recent labs reportedly around HgA1C 7%.    Patient reports compliance with taking all hypertension medications daily despite blood pressures running in the 140s to 170s systolic.  Currently takes Amlodipine 10 mg QD, Losartan 100 mg QD, and Metoprolol Succinate 100 mg in the P.M.

## 2024-07-26 NOTE — ASSESSMENT
[FreeTextEntry1] : 79M with hx of HTN, HLD, Grade II diastolic dysfunction, carotid disease, recently uncontrolled diabetes, moderately dilated left atrium.  Patient comes to the office for a follow up visit after being found to have New Onset Atrial Fibrillation at his PCP's office yesterday (Dr. Avila).  He was at his PCP's office yesterday regarding a clearance for an upcoming (MILD) lumbar spinal stenosis procedure tentatively scheduled for August 14th.   He was recommended to follow up with Cardiology prior to being given clearance for surgery.  Patient presents in-office today with his daughter and son who are translating.  Patient admits to feeling more fatigued these last few days, however, he denies associated symptoms such as exertional chest pain, no palpitations, no CROWLEY, no PND, no orthopnea, no leg edema, no claudication, no syncope.  After further discussion, it appears patient has not had an ischemic workup for over 10 years.  His diabetes has apparently been uncontrolled over the last many months with HgA1C >9%, but has since improved with most recent labs reportedly around HgA1C 7%.    Patient reports compliance with taking all hypertension medications daily despite blood pressures running in the 140s to 170s systolic.  Currently takes Amlodipine 10 mg QD, Losartan 100 mg QD, and Metoprolol Succinate 100 mg in the P.M.    Most recent labs from (1/20/2024):  Creatinine 0.70, BUN 30, Glucose 169.    #New Onset AF with RVR/Fatigue  Start A/C for thromboembolic prophylaxis (2 boxes Eliquis 5 mg BID samples given) Rx sent to pharm.  Increase Metoprolol Succinate 100 mg to twice daily dosing Applied 24-hour Holter monitor today to further assess heart rates and AF burden.  Recommend CLARISA guided DCCV at [facility] in near future (Dr. Danielle notified).    Recommend ischemic workup including pharm NST.  Recommend updated TTE to assess overall cardiac function/structure.  may stop ASA until further notice.   #Spine surgery Will be postponed until further notice.  Patient must be cardiovascularly stable.    #Diastolic dysfunction/ HTN Grade II diastolic dysfunction noted on TTE on Jardiance. Repeat TTE ordered Beta blocker increased to BID dosing.  DASH diet encouraged.  Increase daily water intake.  Decrease daily espressos to 1-2 daily (was 4 daily).    #Non-insulin dependent diabetes Follow closely with PCP regarding management.  Routine labs drawn.    #Carotid disease continue with statin  RTC with Dr. Danielle shortly after testing/procedure or PRN.

## 2024-07-26 NOTE — PHYSICAL EXAM
[Well Developed] : well developed [No Acute Distress] : no acute distress [Normal Conjunctiva] : normal conjunctiva [Normal Venous Pressure] : normal venous pressure [No Carotid Bruit] : no carotid bruit [No Murmur] : no murmur [No Rub] : no rub [No Gallop] : no gallop [Clear Lung Fields] : clear lung fields [Good Air Entry] : good air entry [No Respiratory Distress] : no respiratory distress  [Soft] : abdomen soft [Non Tender] : non-tender [No Masses/organomegaly] : no masses/organomegaly [No Edema] : no edema [No Cyanosis] : no cyanosis [No Clubbing] : no clubbing [No Rash] : no rash [No Skin Lesions] : no skin lesions [Moves all extremities] : moves all extremities [No Focal Deficits] : no focal deficits [Normal Speech] : normal speech [Alert and Oriented] : alert and oriented [Normal memory] : normal memory [Obese] : obese [de-identified] : irregularly irregular with elevated heart rate [de-identified] : ambulates with limp.  Immobile LLE

## 2024-07-26 NOTE — REVIEW OF SYSTEMS
[Feeling Fatigued] : feeling fatigued [Joint Pain] : joint pain [Negative] : Heme/Lymph [FreeTextEntry9] : lower back pain

## 2024-07-26 NOTE — CARDIOLOGY SUMMARY
[de-identified] :  8/30/23: NSR 12/28/23: NSR 3/28/24: NSR 7/26/24: New Onset Atrial Fibrillation with RVR [de-identified] : TTE 10/9/23: 1. Normal biventricular systolic function. 2. There is moderate (grade 2) left ventricular diastolic dysfunction, with elevated filling pressure. 3. The left atrium is moderately dilated in size.4. The right atrium is dilated in size. 5. Mild mitral regurgitation.

## 2024-08-20 ENCOUNTER — OFFICE (OUTPATIENT)
Dept: URBAN - METROPOLITAN AREA CLINIC 94 | Facility: CLINIC | Age: 79
Setting detail: OPHTHALMOLOGY
End: 2024-08-20
Payer: COMMERCIAL

## 2024-08-20 DIAGNOSIS — H43.11: ICD-10-CM

## 2024-08-20 DIAGNOSIS — H35.3112: ICD-10-CM

## 2024-08-20 DIAGNOSIS — H35.341: ICD-10-CM

## 2024-08-20 DIAGNOSIS — E11.3513: ICD-10-CM

## 2024-08-20 DIAGNOSIS — H43.391: ICD-10-CM

## 2024-08-20 PROCEDURE — 99024 POSTOP FOLLOW-UP VISIT: CPT | Performed by: OPHTHALMOLOGY

## 2024-08-20 PROCEDURE — 92134 CPTRZ OPH DX IMG PST SGM RTA: CPT | Performed by: OPHTHALMOLOGY

## 2024-08-22 ENCOUNTER — NON-APPOINTMENT (OUTPATIENT)
Age: 79
End: 2024-08-22

## 2024-08-22 ENCOUNTER — INPATIENT (INPATIENT)
Facility: HOSPITAL | Age: 79
LOS: 0 days | Discharge: HOME | DRG: 310 | End: 2024-08-23
Attending: STUDENT IN AN ORGANIZED HEALTH CARE EDUCATION/TRAINING PROGRAM | Admitting: STUDENT IN AN ORGANIZED HEALTH CARE EDUCATION/TRAINING PROGRAM
Payer: MEDICARE

## 2024-08-22 VITALS
RESPIRATION RATE: 20 BRPM | WEIGHT: 196.43 LBS | SYSTOLIC BLOOD PRESSURE: 163 MMHG | HEART RATE: 138 BPM | DIASTOLIC BLOOD PRESSURE: 104 MMHG | OXYGEN SATURATION: 96 % | TEMPERATURE: 98 F

## 2024-08-22 DIAGNOSIS — I48.92 UNSPECIFIED ATRIAL FLUTTER: ICD-10-CM

## 2024-08-22 LAB
ALBUMIN SERPL ELPH-MCNC: 3.8 G/DL — SIGNIFICANT CHANGE UP (ref 3.3–5.2)
ALP SERPL-CCNC: 50 U/L — SIGNIFICANT CHANGE UP (ref 40–120)
ALT FLD-CCNC: 18 U/L — SIGNIFICANT CHANGE UP
ANION GAP SERPL CALC-SCNC: 13 MMOL/L — SIGNIFICANT CHANGE UP (ref 5–17)
AST SERPL-CCNC: 15 U/L — SIGNIFICANT CHANGE UP
BASOPHILS # BLD AUTO: 0.01 K/UL — SIGNIFICANT CHANGE UP (ref 0–0.2)
BASOPHILS NFR BLD AUTO: 0.2 % — SIGNIFICANT CHANGE UP (ref 0–2)
BILIRUB SERPL-MCNC: 0.3 MG/DL — LOW (ref 0.4–2)
BUN SERPL-MCNC: 27.7 MG/DL — HIGH (ref 8–20)
CALCIUM SERPL-MCNC: 9.5 MG/DL — SIGNIFICANT CHANGE UP (ref 8.4–10.5)
CHLORIDE SERPL-SCNC: 107 MMOL/L — SIGNIFICANT CHANGE UP (ref 96–108)
CO2 SERPL-SCNC: 22 MMOL/L — SIGNIFICANT CHANGE UP (ref 22–29)
CREAT SERPL-MCNC: 0.73 MG/DL — SIGNIFICANT CHANGE UP (ref 0.5–1.3)
EGFR: 93 ML/MIN/1.73M2 — SIGNIFICANT CHANGE UP
EOSINOPHIL # BLD AUTO: 0.06 K/UL — SIGNIFICANT CHANGE UP (ref 0–0.5)
EOSINOPHIL NFR BLD AUTO: 1 % — SIGNIFICANT CHANGE UP (ref 0–6)
GLUCOSE SERPL-MCNC: 127 MG/DL — HIGH (ref 70–99)
HCT VFR BLD CALC: 40.5 % — SIGNIFICANT CHANGE UP (ref 39–50)
HGB BLD-MCNC: 13.6 G/DL — SIGNIFICANT CHANGE UP (ref 13–17)
IMM GRANULOCYTES NFR BLD AUTO: 0.2 % — SIGNIFICANT CHANGE UP (ref 0–0.9)
LYMPHOCYTES # BLD AUTO: 2.36 K/UL — SIGNIFICANT CHANGE UP (ref 1–3.3)
LYMPHOCYTES # BLD AUTO: 39.3 % — SIGNIFICANT CHANGE UP (ref 13–44)
MAGNESIUM SERPL-MCNC: 1.8 MG/DL — SIGNIFICANT CHANGE UP (ref 1.6–2.6)
MCHC RBC-ENTMCNC: 28.6 PG — SIGNIFICANT CHANGE UP (ref 27–34)
MCHC RBC-ENTMCNC: 33.6 GM/DL — SIGNIFICANT CHANGE UP (ref 32–36)
MCV RBC AUTO: 85.3 FL — SIGNIFICANT CHANGE UP (ref 80–100)
MONOCYTES # BLD AUTO: 0.6 K/UL — SIGNIFICANT CHANGE UP (ref 0–0.9)
MONOCYTES NFR BLD AUTO: 10 % — SIGNIFICANT CHANGE UP (ref 2–14)
NEUTROPHILS # BLD AUTO: 2.97 K/UL — SIGNIFICANT CHANGE UP (ref 1.8–7.4)
NEUTROPHILS NFR BLD AUTO: 49.3 % — SIGNIFICANT CHANGE UP (ref 43–77)
PLATELET # BLD AUTO: 257 K/UL — SIGNIFICANT CHANGE UP (ref 150–400)
POTASSIUM SERPL-MCNC: 3.6 MMOL/L — SIGNIFICANT CHANGE UP (ref 3.5–5.3)
POTASSIUM SERPL-SCNC: 3.6 MMOL/L — SIGNIFICANT CHANGE UP (ref 3.5–5.3)
PROT SERPL-MCNC: 6.9 G/DL — SIGNIFICANT CHANGE UP (ref 6.6–8.7)
RBC # BLD: 4.75 M/UL — SIGNIFICANT CHANGE UP (ref 4.2–5.8)
RBC # FLD: 12.9 % — SIGNIFICANT CHANGE UP (ref 10.3–14.5)
SODIUM SERPL-SCNC: 142 MMOL/L — SIGNIFICANT CHANGE UP (ref 135–145)
TROPONIN T, HIGH SENSITIVITY RESULT: 29 NG/L — SIGNIFICANT CHANGE UP (ref 0–51)
TROPONIN T, HIGH SENSITIVITY RESULT: 32 NG/L — SIGNIFICANT CHANGE UP (ref 0–51)
TSH SERPL-MCNC: 2.11 UIU/ML — SIGNIFICANT CHANGE UP (ref 0.27–4.2)
WBC # BLD: 6.01 K/UL — SIGNIFICANT CHANGE UP (ref 3.8–10.5)
WBC # FLD AUTO: 6.01 K/UL — SIGNIFICANT CHANGE UP (ref 3.8–10.5)

## 2024-08-22 PROCEDURE — 99223 1ST HOSP IP/OBS HIGH 75: CPT

## 2024-08-22 PROCEDURE — 71045 X-RAY EXAM CHEST 1 VIEW: CPT | Mod: 26

## 2024-08-22 PROCEDURE — 99285 EMERGENCY DEPT VISIT HI MDM: CPT

## 2024-08-22 RX ORDER — APIXABAN 5 MG/1
5 TABLET, FILM COATED ORAL EVERY 12 HOURS
Refills: 0 | Status: DISCONTINUED | OUTPATIENT
Start: 2024-08-23 | End: 2024-08-23

## 2024-08-22 RX ORDER — DEXTROSE 15 G/33 G
25 GEL IN PACKET (GRAM) ORAL ONCE
Refills: 0 | Status: DISCONTINUED | OUTPATIENT
Start: 2024-08-22 | End: 2024-08-23

## 2024-08-22 RX ORDER — AMLODIPINE BESYLATE 10 MG/1
10 TABLET ORAL DAILY
Refills: 0 | Status: DISCONTINUED | OUTPATIENT
Start: 2024-08-22 | End: 2024-08-23

## 2024-08-22 RX ORDER — ROSUVASTATIN CALCIUM 10 MG/1
5 TABLET ORAL AT BEDTIME
Refills: 0 | Status: DISCONTINUED | OUTPATIENT
Start: 2024-08-22 | End: 2024-08-23

## 2024-08-22 RX ORDER — ONDANSETRON 2 MG/ML
4 INJECTION, SOLUTION INTRAMUSCULAR; INTRAVENOUS EVERY 8 HOURS
Refills: 0 | Status: DISCONTINUED | OUTPATIENT
Start: 2024-08-22 | End: 2024-08-23

## 2024-08-22 RX ORDER — METOPROLOL TARTRATE 100 MG/1
5 TABLET ORAL ONCE
Refills: 0 | Status: COMPLETED | OUTPATIENT
Start: 2024-08-22 | End: 2024-08-22

## 2024-08-22 RX ORDER — SODIUM CHLORIDE 9 MG/ML
3 INJECTION INTRAMUSCULAR; INTRAVENOUS; SUBCUTANEOUS EVERY 8 HOURS
Refills: 0 | Status: DISCONTINUED | OUTPATIENT
Start: 2024-08-22 | End: 2024-08-23

## 2024-08-22 RX ORDER — ACETAMINOPHEN 325 MG/1
650 TABLET ORAL EVERY 6 HOURS
Refills: 0 | Status: DISCONTINUED | OUTPATIENT
Start: 2024-08-22 | End: 2024-08-23

## 2024-08-22 RX ORDER — DEXTROSE 15 G/33 G
15 GEL IN PACKET (GRAM) ORAL ONCE
Refills: 0 | Status: DISCONTINUED | OUTPATIENT
Start: 2024-08-22 | End: 2024-08-23

## 2024-08-22 RX ORDER — DEXTROSE 15 G/33 G
12.5 GEL IN PACKET (GRAM) ORAL ONCE
Refills: 0 | Status: DISCONTINUED | OUTPATIENT
Start: 2024-08-22 | End: 2024-08-23

## 2024-08-22 RX ORDER — GLUCAGON INJECTION, SOLUTION 1 MG/.2ML
1 INJECTION, SOLUTION SUBCUTANEOUS ONCE
Refills: 0 | Status: DISCONTINUED | OUTPATIENT
Start: 2024-08-22 | End: 2024-08-23

## 2024-08-22 RX ORDER — METOPROLOL TARTRATE 100 MG/1
100 TABLET ORAL ONCE
Refills: 0 | Status: COMPLETED | OUTPATIENT
Start: 2024-08-22 | End: 2024-08-22

## 2024-08-22 RX ORDER — LOSARTAN POTASSIUM 50 MG/1
100 TABLET ORAL DAILY
Refills: 0 | Status: DISCONTINUED | OUTPATIENT
Start: 2024-08-22 | End: 2024-08-23

## 2024-08-22 RX ORDER — METOPROLOL TARTRATE 100 MG/1
100 TABLET ORAL DAILY
Refills: 0 | Status: DISCONTINUED | OUTPATIENT
Start: 2024-08-22 | End: 2024-08-23

## 2024-08-22 RX ORDER — METOPROLOL TARTRATE 100 MG/1
100 TABLET ORAL AT BEDTIME
Refills: 0 | Status: DISCONTINUED | OUTPATIENT
Start: 2024-08-23 | End: 2024-08-23

## 2024-08-22 RX ORDER — PANTOPRAZOLE SODIUM 40 MG
40 TABLET, DELAYED RELEASE (ENTERIC COATED) ORAL
Refills: 0 | Status: DISCONTINUED | OUTPATIENT
Start: 2024-08-22 | End: 2024-08-23

## 2024-08-22 RX ORDER — MAGNESIUM, ALUMINUM HYDROXIDE 200-225/5
30 SUSPENSION, ORAL (FINAL DOSE FORM) ORAL EVERY 4 HOURS
Refills: 0 | Status: DISCONTINUED | OUTPATIENT
Start: 2024-08-22 | End: 2024-08-23

## 2024-08-22 RX ADMIN — METOPROLOL TARTRATE 100 MILLIGRAM(S): 100 TABLET ORAL at 20:11

## 2024-08-22 RX ADMIN — METOPROLOL TARTRATE 5 MILLIGRAM(S): 100 TABLET ORAL at 20:05

## 2024-08-22 NOTE — H&P ADULT - HISTORY OF PRESENT ILLNESS
80 y/o male with hx of Atrial flutter on Eliquis, HTN, HLD, HTN who was sent in from PMD for Atrial flutter with RVR in 150s. Patient was there for pre operative eval for eventual back surgery. Daughter at bedside for collateral information. Patient has had his rate controlling medications adjusted but he still has episodes of RVR. Denies CP, SOB, N/V, or diaphoresis. He ambulates mostly independently but has been using a cane more frequently due to low back pain from DJD. No falls or bleeding reported. He admits to drinking upwards of 5 shots of espresso daily but has since cut down to 1-2 to help with his heart rate. Denies any other recent illnesses or medication changes and he and his Daughter state his is compliant with his medications. In the ED noted to be in Atrial flutter with variable block. No evidence of CHF/ACS, labs otherwise unremarkable. He was given his evening dose of Toprol and IV Metoprolol with good effect and HR now  . TSH normal. ED discussed case with Dr. Danielle who advised admission for possible CLARISA/DCCV in AM.

## 2024-08-22 NOTE — ED ADULT NURSE NOTE - FINAL NURSING ELECTRONIC SIGNATURE
[FreeTextEntry1] : GRADY CHOUDHURY is a 63 year old male with hypertension, hyperlipidemia, coronary artery disease (s/p PCI LCx) and paroxysmal atrial tachycardia who presents for follow up.\par \par To summarize his history, he was first evaluated by EP by Dr. Miranda in 1/2014 for palpitations. He underwent EP study in 4/2012 where AT was found to be mapped earliest to the region of the Bundle of His. Given high risk of heart block, ablation was not attempted and he was instead started on Flecainide 150mg BID with which he had better control of his arrhythmia. In July of 2014 he underwent PCI for 99% LCx disease. His dose of Flecainide was reduced to 100mg BID because he had bradycardia. Recently he noted that he had some imbalance, especially when standing. Recently his Flecainide dose was reduced to 50mg BID with which he feels improved. His Flecainide was discontinued in 9/2020 due to history of coronary artery disease.\par \par Presents for EP f/up with ongoing complaints of brief palpitations.  Symptoms occur weekly, lasting only seconds before resolving spontaneously.  Symptoms increased in frequency following his first COVID vaccine, but have recently improved.  No dizziness, syncope, presyncope.  NO CP, SOB, or SAMANO. \par Wore 48 hour HM which showed sinus rhythm with rare isolated ectopy.  Reported symptoms of palpitations that correlated with sinus rhythm.  NO AT/AF events noted. 
23-Aug-2024 02:26

## 2024-08-22 NOTE — ED PROVIDER NOTE - CLINICAL SUMMARY MEDICAL DECISION MAKING FREE TEXT BOX
79yM with pmh afib on eliquis, htn, gerd presenting with elevated heart rate. Patient in rapid afib vs rapid aflutter. Rhthm appears irregular at times but also with saw tooth pattern and occasional p wave. No STEMI. Patient is non-toxic appearing, BP stable HR improved with 5IV metoprolol and 100mg PO metoprolol. Case discussed with Dr. Danielle. Will keep patient NPO overnight for likely possible cardioversion.

## 2024-08-22 NOTE — ED PROVIDER NOTE - PHYSICAL EXAMINATION
Gen: no acute distress  Head: normocephalic, atraumatic  EENT: EOMI  Lung: no increased work of breathing, CTABL  CV: normal s1/s2, tachy,   MSK: no visible deformities, full range of motion in all 4 extremities  Neuro: A&Ox4; No focal neurologic deficits

## 2024-08-22 NOTE — ED PROVIDER NOTE - OBJECTIVE STATEMENT
79yM 79yM with pmh afib on eliquis, htn, gerd presenting with elevated heart rate. Patient was diagnoses with afib this year and has recently been increasing home metoprolol per cardiologist. Started eliquis 7/30. Patient was sent in by MD for aflutter at rate 150s. Denies chest pain, dyspnea, abd pain/N/V, dizziness, fevers, chills, Endorses adherence to medication.

## 2024-08-22 NOTE — H&P ADULT - NSICDXPASTMEDICALHX_GEN_ALL_CORE_FT
PAST MEDICAL HISTORY:  Atrial flutter     Back pain     Diabetes     HLD (hyperlipidemia)     HTN (hypertension)

## 2024-08-22 NOTE — H&P ADULT - ASSESSMENT
78 y/o male with Atrial flutter with variable block, Hx of HTN, HLD, DM-2, back pain     Atrial flutter/RVR:  -Admit to tele   -Improved with PO/IV BB  -Advised to cut out caffeine as much as possible  -Cont. home Regimen for now  -Cont. Eliquis, denies falls/bleeding, Hbg normal  -NPO after MN except meds for possible CLARISA/DCCV in AM  -Cardiology f/u in AM   -OOB with assistance/cane  -Fall risk  -VC boots/on Eliquis     HTN:  -Resume o/p regimen  -DASH diet when no longer NPO    HLD:  -Cont. Crestor     DM-2:  -Hold oral regimen  -Accu checks Q 6 hours while npo with SS coverage  -Hold basal coverage for now    Back pain:  -PRN pain regimen  -Fall risk  -F/U with orthopedist post DC as scheduled     Discussed with Daughter at bedside, all questions/concerns addressed     Dispo: DC home post cardioversion when ok with Cardiology. Anticipated LOS 1-2 days

## 2024-08-22 NOTE — H&P ADULT - TIME BILLING
Reviewing medical record, ED course, independently reviewing labs/imaging studies, discussing case with ED attending, examining patient, furnishing H&P, orders, doing medication reconciliation, discussing plan of care with Patient/Daughter and answering all their questions.

## 2024-08-23 ENCOUNTER — TRANSCRIPTION ENCOUNTER (OUTPATIENT)
Age: 79
End: 2024-08-23

## 2024-08-23 VITALS
SYSTOLIC BLOOD PRESSURE: 158 MMHG | HEART RATE: 65 BPM | RESPIRATION RATE: 17 BRPM | DIASTOLIC BLOOD PRESSURE: 75 MMHG | OXYGEN SATURATION: 97 % | TEMPERATURE: 98 F

## 2024-08-23 LAB
A1C WITH ESTIMATED AVERAGE GLUCOSE RESULT: 7.8 % — HIGH (ref 4–5.6)
ESTIMATED AVERAGE GLUCOSE: 177 MG/DL — HIGH (ref 68–114)
GLUCOSE BLDC GLUCOMTR-MCNC: 140 MG/DL — HIGH (ref 70–99)
GLUCOSE BLDC GLUCOMTR-MCNC: 146 MG/DL — HIGH (ref 70–99)
GLUCOSE BLDC GLUCOMTR-MCNC: 198 MG/DL — HIGH (ref 70–99)

## 2024-08-23 PROCEDURE — 83036 HEMOGLOBIN GLYCOSYLATED A1C: CPT

## 2024-08-23 PROCEDURE — 83735 ASSAY OF MAGNESIUM: CPT

## 2024-08-23 PROCEDURE — 99285 EMERGENCY DEPT VISIT HI MDM: CPT

## 2024-08-23 PROCEDURE — 84484 ASSAY OF TROPONIN QUANT: CPT

## 2024-08-23 PROCEDURE — 85025 COMPLETE CBC W/AUTO DIFF WBC: CPT

## 2024-08-23 PROCEDURE — 84443 ASSAY THYROID STIM HORMONE: CPT

## 2024-08-23 PROCEDURE — 36415 COLL VENOUS BLD VENIPUNCTURE: CPT

## 2024-08-23 PROCEDURE — 71045 X-RAY EXAM CHEST 1 VIEW: CPT

## 2024-08-23 PROCEDURE — 99239 HOSP IP/OBS DSCHRG MGMT >30: CPT

## 2024-08-23 PROCEDURE — 96374 THER/PROPH/DIAG INJ IV PUSH: CPT

## 2024-08-23 PROCEDURE — 82962 GLUCOSE BLOOD TEST: CPT

## 2024-08-23 PROCEDURE — 93005 ELECTROCARDIOGRAM TRACING: CPT

## 2024-08-23 PROCEDURE — 80053 COMPREHEN METABOLIC PANEL: CPT

## 2024-08-23 PROCEDURE — 99222 1ST HOSP IP/OBS MODERATE 55: CPT

## 2024-08-23 RX ORDER — METOPROLOL TARTRATE 100 MG/1
1 TABLET ORAL
Refills: 0 | DISCHARGE

## 2024-08-23 RX ORDER — METOPROLOL TARTRATE 100 MG/1
1 TABLET ORAL
Qty: 60 | Refills: 0
Start: 2024-08-23 | End: 2024-09-21

## 2024-08-23 RX ORDER — METOPROLOL TARTRATE 100 MG/1
1 TABLET ORAL
Qty: 60 | Refills: 0 | DISCHARGE
Start: 2024-08-23 | End: 2024-09-21

## 2024-08-23 RX ORDER — DRONEDARONE 400 MG/1
1 TABLET, FILM COATED ORAL
Qty: 60 | Refills: 0
Start: 2024-08-23 | End: 2024-09-21

## 2024-08-23 RX ADMIN — Medication 2: at 13:22

## 2024-08-23 RX ADMIN — Medication 40 MILLIGRAM(S): at 05:41

## 2024-08-23 RX ADMIN — SODIUM CHLORIDE 3 MILLILITER(S): 9 INJECTION INTRAMUSCULAR; INTRAVENOUS; SUBCUTANEOUS at 13:28

## 2024-08-23 RX ADMIN — LOSARTAN POTASSIUM 100 MILLIGRAM(S): 50 TABLET ORAL at 05:42

## 2024-08-23 RX ADMIN — AMLODIPINE BESYLATE 10 MILLIGRAM(S): 10 TABLET ORAL at 05:42

## 2024-08-23 RX ADMIN — SODIUM CHLORIDE 3 MILLILITER(S): 9 INJECTION INTRAMUSCULAR; INTRAVENOUS; SUBCUTANEOUS at 06:24

## 2024-08-23 RX ADMIN — APIXABAN 5 MILLIGRAM(S): 5 TABLET, FILM COATED ORAL at 05:42

## 2024-08-23 RX ADMIN — METOPROLOL TARTRATE 100 MILLIGRAM(S): 100 TABLET ORAL at 05:42

## 2024-08-23 NOTE — DISCHARGE NOTE PROVIDER - NSDCCPCAREPLAN_GEN_ALL_CORE_FT
PRINCIPAL DISCHARGE DIAGNOSIS  Diagnosis: Atrial flutter with rapid ventricular response  Assessment and Plan of Treatment: Continue with your medication, limit espresso consumption upon discharge, stick to Decaffinated. Follow up with cardiology and electrophysiology outpateint. MCOT monitoring device to be ordered by electrophysiology

## 2024-08-23 NOTE — PATIENT PROFILE ADULT - CAREGIVER RELATION TO PATIENT
Attempted to contact pt. Left voicemail, call to schedule L knee PRP 1/1. Asked pt to return call to clinic at [phone] or respond to [employer] message.      daughter

## 2024-08-23 NOTE — DISCHARGE NOTE NURSING/CASE MANAGEMENT/SOCIAL WORK - NSDCPEELIQUISDIET_GEN_ALL_CORE
Eat healthy foods you enjoy. Apixaban/Eliquis DOES NOT have a special diet. Limit your alcohol intake.
negative

## 2024-08-23 NOTE — DISCHARGE NOTE PROVIDER - NSDCFUSCHEDAPPT_GEN_ALL_CORE_FT
[facility]  CARDIOLOGY [address]  Scheduled Appointment: 08/29/2024    [facility]  CARDIOLOGY [address]  Scheduled Appointment: 09/12/2024    Reed Danielle  [facility]  CARDIOLOGY [address]  Scheduled Appointment: 09/26/2024

## 2024-08-23 NOTE — PATIENT PROFILE ADULT - FALL HARM RISK - HARM RISK INTERVENTIONS

## 2024-08-23 NOTE — DISCHARGE NOTE PROVIDER - CARE PROVIDER_API CALL
Bob Palmer  Cardiac Electrophysiology  [address], [address]  [place] [address]  Phone: [phone]  Fax: [phone]  Follow Up Time: 2 weeks

## 2024-08-23 NOTE — DISCHARGE NOTE NURSING/CASE MANAGEMENT/SOCIAL WORK - PATIENT PORTAL LINK FT
You can access the FollowMyHealth Patient Portal offered by [facility] [place] by registering at the following website: [email or web address]. By joining [employer]’s FollowMyHealth portal, you will also be able to view your health information using other applications (apps) compatible with our system.

## 2024-08-23 NOTE — DISCHARGE NOTE PROVIDER - HOSPITAL COURSE
80 y/o M admitted to hospital w/ Atrial Flutter w/ variable heart block. Converted back to NSR spontaneously, seen by own cardiology and house EP, no need for CLARISA/DCCV inpatient, patient deemed fit for DC home w/ outpatient MCOT and f/u w/ EP and cardiology.    #Atrial Flutter w/ RVR

## 2024-08-23 NOTE — CONSULT NOTE ADULT - SUBJECTIVE AND OBJECTIVE BOX
CHIEF COMPLAINT:    HPI: 79M with hx of HTN, HLD, Grade II diastolic dysfunction, carotid disease, recently uncontrolled diabetes, moderately dilated left atrium, afib on AC, patient was yesterday at  's office faxed over in office EKG. Pt was in aflutter, hr of 150, patient was advised to come to the ED for further management.     PAST MEDICAL & SURGICAL HISTORY:  Atrial flutter      HTN (hypertension)      HLD (hyperlipidemia)      Diabetes      Back pain      No significant past surgical history          Allergies    No Known Allergies    Intolerances        MEDICATIONS  (STANDING):  amLODIPine   Tablet 10 milliGRAM(s) Oral daily  apixaban 5 milliGRAM(s) Oral every 12 hours  dextrose 5%. 1000 milliLiter(s) (50 mL/Hr) IV Continuous <Continuous>  dextrose 5%. 1000 milliLiter(s) (100 mL/Hr) IV Continuous <Continuous>  dextrose 50% Injectable 25 Gram(s) IV Push once  dextrose 50% Injectable 12.5 Gram(s) IV Push once  dextrose 50% Injectable 25 Gram(s) IV Push once  glucagon  Injectable 1 milliGRAM(s) IntraMuscular once  insulin lispro (ADMELOG) corrective regimen sliding scale   SubCutaneous every 6 hours  losartan 100 milliGRAM(s) Oral daily  metoprolol succinate  milliGRAM(s) Oral daily  metoprolol succinate  milliGRAM(s) Oral at bedtime  pantoprazole    Tablet 40 milliGRAM(s) Oral before breakfast  rosuvastatin 5 milliGRAM(s) Oral at bedtime  sodium chloride 0.9% lock flush 3 milliLiter(s) IV Push every 8 hours    MEDICATIONS  (PRN):  acetaminophen     Tablet .. 650 milliGRAM(s) Oral every 6 hours PRN Temp greater or equal to 38C (100.4F), Mild Pain (1 - 3)  aluminum hydroxide/magnesium hydroxide/simethicone Suspension 30 milliLiter(s) Oral every 4 hours PRN Dyspepsia  dextrose Oral Gel 15 Gram(s) Oral once PRN Blood Glucose LESS THAN 70 milliGRAM(s)/deciliter  melatonin 3 milliGRAM(s) Oral at bedtime PRN Insomnia  ondansetron Injectable 4 milliGRAM(s) IV Push every 8 hours PRN Nausea and/or Vomiting      FAMILY HISTORY:  No pertinent family history in first degree relatives        ***No family history of premature coronary artery disease or sudden cardiac death    SOCIAL HISTORY:  Smoking-  Alcohol-  Illicit Drug use-    REVIEW OF SYSTEMS:  Constitutional: [ ] fever, [ ]weight loss,  [ ]fatigue  Eyes: [ ] visual changes  Respiratory: [ ]shortness of breath;  [ ] cough, [ ]wheezing, [ ]chills, [ ]hemoptysis  Cardiovascular: [ ] chest pain, [ ]palpitations, [ ]dizziness,  [ ]leg swelling [ ]syncope  Gastrointestinal: [ ] abdominal pain, [ ]nausea, [ ]vomiting,  [ ]diarrhea   Genitourinary: [ ] dysuria, [ ] hematuria  Neurologic: [ ] headaches [ ] tremors  [ ] weakness [ ] lightheadedness  Skin: [ ] itching, [ ]burning, [ ] rashes  Endocrine: [ ] heat or cold intolerance  Musculoskeletal: [ ] joint pain or swelling; [ ] muscle, back, or extremity pain  Psychiatric: [ ] depression, [ ]anxiety, [ ]mood swings, or [ ]difficulty sleeping  Hematologic: [ ] easy bruising, [ ] bleeding gums     [ x] All others negative	  [ ] Unable to obtain    Vital Signs Last 24 Hrs  T(C): 36.5 (23 Aug 2024 07:39), Max: 36.9 (22 Aug 2024 18:05)  T(F): 97.7 (23 Aug 2024 07:39), Max: 98.5 (22 Aug 2024 18:05)  HR: 68 (23 Aug 2024 07:39) (68 - 138)  BP: 152/79 (23 Aug 2024 07:39) (143/82 - 175/82)  BP(mean): 113 (23 Aug 2024 02:20) (104 - 113)  RR: 17 (23 Aug 2024 07:39) (16 - 20)  SpO2: 98% (23 Aug 2024 07:39) (95% - 98%)    Parameters below as of 23 Aug 2024 07:39  Patient On (Oxygen Delivery Method): room air      I&O's Summary      PHYSICAL EXAM:  General: No acute distress  HEENT: EOMI  Neck:  No JVD  Lungs: Clear to auscultation bilaterally; No rales or wheezing  Heart: Regular rate and rhythm; No murmurs, rubs, or gallops  Abdomen: soft, non tender, non distended   Extremities: warm, no edema   Nervous system:  Alert & Oriented X3  Psychiatric: Normal affect  Skin: No rashes or lesions    LABS:  08-22    142  |  107  |  27.7<H>  ----------------------------<  127<H>  3.6   |  22.0  |  0.73    Ca    9.5      22 Aug 2024 20:00  Mg     1.8     08-22    TPro  6.9  /  Alb  3.8  /  TBili  0.3<L>  /  DBili  x   /  AST  15  /  ALT  18  /  AlkPhos  50  08-22    Creatinine Trend: 0.73<--                        13.6   6.01  )-----------( 257      ( 22 Aug 2024 20:00 )             40.5         Lipid Panel:   Cardiac Enzymes:           RADIOLOGY:    ECG [my interpretation]:    TELEMETRY:    Echo: TTE 10/9/23: 1. Normal biventricular systolic function. 2. There is moderate (grade 2) left ventricular diastolic dysfunction, with elevated filling pressure. 3. The left atrium is moderately dilated in size.4. The right atrium is dilated in size. 5. Mild mitral regurgitation.    STRESS TEST:    CATHETERIZATION: CHIEF COMPLAINT:    HPI: 79M with hx of HTN, HLD, Grade II diastolic dysfunction, carotid disease, recently uncontrolled diabetes, moderately dilated left atrium, afib on AC, patient was yesterday at  's office faxed over in office EKG. Pt was in aflutter, hr of 150, patient was advised to come to the ED for further management.  Upon arrival patient was treated with IV lopressor and was admitted.     PAST MEDICAL & SURGICAL HISTORY:  Atrial flutter      HTN (hypertension)      HLD (hyperlipidemia)      Diabetes      Back pain      No significant past surgical history          Allergies    No Known Allergies    Intolerances        MEDICATIONS  (STANDING):  amLODIPine   Tablet 10 milliGRAM(s) Oral daily  apixaban 5 milliGRAM(s) Oral every 12 hours  dextrose 5%. 1000 milliLiter(s) (50 mL/Hr) IV Continuous <Continuous>  dextrose 5%. 1000 milliLiter(s) (100 mL/Hr) IV Continuous <Continuous>  dextrose 50% Injectable 25 Gram(s) IV Push once  dextrose 50% Injectable 12.5 Gram(s) IV Push once  dextrose 50% Injectable 25 Gram(s) IV Push once  glucagon  Injectable 1 milliGRAM(s) IntraMuscular once  insulin lispro (ADMELOG) corrective regimen sliding scale   SubCutaneous every 6 hours  losartan 100 milliGRAM(s) Oral daily  metoprolol succinate  milliGRAM(s) Oral daily  metoprolol succinate  milliGRAM(s) Oral at bedtime  pantoprazole    Tablet 40 milliGRAM(s) Oral before breakfast  rosuvastatin 5 milliGRAM(s) Oral at bedtime  sodium chloride 0.9% lock flush 3 milliLiter(s) IV Push every 8 hours    MEDICATIONS  (PRN):  acetaminophen     Tablet .. 650 milliGRAM(s) Oral every 6 hours PRN Temp greater or equal to 38C (100.4F), Mild Pain (1 - 3)  aluminum hydroxide/magnesium hydroxide/simethicone Suspension 30 milliLiter(s) Oral every 4 hours PRN Dyspepsia  dextrose Oral Gel 15 Gram(s) Oral once PRN Blood Glucose LESS THAN 70 milliGRAM(s)/deciliter  melatonin 3 milliGRAM(s) Oral at bedtime PRN Insomnia  ondansetron Injectable 4 milliGRAM(s) IV Push every 8 hours PRN Nausea and/or Vomiting      FAMILY HISTORY:  No pertinent family history in first degree relatives        ***No family history of premature coronary artery disease or sudden cardiac death    SOCIAL HISTORY:  Smoking-  Alcohol-  Illicit Drug use-    REVIEW OF SYSTEMS:  Constitutional: [ ] fever, [ ]weight loss,  [ ]fatigue  Eyes: [ ] visual changes  Respiratory: [ ]shortness of breath;  [ ] cough, [ ]wheezing, [ ]chills, [ ]hemoptysis  Cardiovascular: [ ] chest pain, [X ]palpitations, [ ]dizziness,  [ ]leg swelling [ ]syncope  Gastrointestinal: [ ] abdominal pain, [ ]nausea, [ ]vomiting,  [ ]diarrhea   Genitourinary: [ ] dysuria, [ ] hematuria  Neurologic: [ ] headaches [ ] tremors  [ ] weakness [ ] lightheadedness  Skin: [ ] itching, [ ]burning, [ ] rashes  Endocrine: [ ] heat or cold intolerance  Musculoskeletal: [ ] joint pain or swelling; [ ] muscle, back, or extremity pain  Psychiatric: [ ] depression, [ ]anxiety, [ ]mood swings, or [ ]difficulty sleeping  Hematologic: [ ] easy bruising, [ ] bleeding gums     [ x] All others negative	  [ ] Unable to obtain    Vital Signs Last 24 Hrs  T(C): 36.5 (23 Aug 2024 07:39), Max: 36.9 (22 Aug 2024 18:05)  T(F): 97.7 (23 Aug 2024 07:39), Max: 98.5 (22 Aug 2024 18:05)  HR: 68 (23 Aug 2024 07:39) (68 - 138)  BP: 152/79 (23 Aug 2024 07:39) (143/82 - 175/82)  BP(mean): 113 (23 Aug 2024 02:20) (104 - 113)  RR: 17 (23 Aug 2024 07:39) (16 - 20)  SpO2: 98% (23 Aug 2024 07:39) (95% - 98%)    Parameters below as of 23 Aug 2024 07:39  Patient On (Oxygen Delivery Method): room air      I&O's Summary      PHYSICAL EXAM:  General: No acute distress  HEENT: EOMI  Neck:  No JVD  Lungs: Clear to auscultation bilaterally; No rales or wheezing  Heart: Regular rate and rhythm; No murmurs, rubs, or gallops  Abdomen: soft, non tender, non distended   Extremities: warm, no edema   Nervous system:  Alert & Oriented X3  Psychiatric: Normal affect  Skin: No rashes or lesions    LABS:  08-22    142  |  107  |  27.7<H>  ----------------------------<  127<H>  3.6   |  22.0  |  0.73    Ca    9.5      22 Aug 2024 20:00  Mg     1.8     08-22    TPro  6.9  /  Alb  3.8  /  TBili  0.3<L>  /  DBili  x   /  AST  15  /  ALT  18  /  AlkPhos  50  08-22    Creatinine Trend: 0.73<--                        13.6   6.01  )-----------( 257      ( 22 Aug 2024 20:00 )             40.5         Lipid Panel:   Cardiac Enzymes:           RADIOLOGY:    ECG < from: 12 Lead ECG (08.22.24 @ 17:56) >  Atrial flutter with variable A-V block  non specific ST wave abnormality      TELEMETRY: SR currently with Atrial flutter and variable block early on admission.     Echo: TTE 10/9/23: 1. Normal biventricular systolic function. 2. There is moderate (grade 2) left ventricular diastolic dysfunction, with elevated filling pressure. 3. The left atrium is moderately dilated in size.4. The right atrium is dilated in size. 5. Mild mitral regurgitation.    STRESS TEST:    CATHETERIZATION:

## 2024-08-23 NOTE — CONSULT NOTE ADULT - ASSESSMENT
79M with hx of HTN, HLD, Grade II diastolic dysfunction, carotid disease, recently uncontrolled diabetes, moderately dilated left atrium, afib on AC, patient was yesterday at  's office faxed over in office EKG. Pt was in aflutter, hr of 150, patient was advised to come to the ED for further management.  Upon arrival patient was treated with IV lopressor and was admitted.     Paroxysmal afib/ flutter   currently in sinus     continue with  AC   Continue with metoprolol at current dose   patient advised to limit expresso consumption upon dc   EP consult (AAT vs ablation)   if no intervention offered patient can be dc with follow up in our office and with EP.   outpatient event monitor.   Please call us back with questions or concerns.

## 2024-08-23 NOTE — DISCHARGE NOTE PROVIDER - NSDCMRMEDTOKEN_GEN_ALL_CORE_FT
Cozaar 100 mg oral tablet: 1 tab(s) orally once a day  Crestor 5 mg oral tablet: 1 tab(s) orally once a day (at bedtime)  Eliquis 5 mg oral tablet: 1 tab(s) orally 2 times a day  glimepiride 2 mg oral tablet: 1 tab(s) orally once a day (at bedtime)  Jardiance 10 mg oral tablet: 1 tab(s) orally once a day  meloxicam 15 mg oral tablet: 1 tab(s) orally once a day as needed for  moderate pain  metFORMIN 1000 mg oral tablet: 1 tab(s) orally 2 times a day  Multaq 400 mg oral tablet: 1 tab(s) orally 2 times a day Take one tablet twice daily  multivitamin: 1 tab(s) once a day  Norvasc 10 mg oral tablet: 1 tab(s) orally once a day  omeprazole 20 mg oral delayed release tablet: 1 tab(s) orally once a day  Toprol- mg oral tablet, extended release: 1 tab(s) orally once a day  Toprol- mg oral tablet, extended release: 1 tab(s) orally once a day (at bedtime)   Cozaar 100 mg oral tablet: 1 tab(s) orally once a day  Crestor 5 mg oral tablet: 1 tab(s) orally once a day (at bedtime)  Eliquis 5 mg oral tablet: 1 tab(s) orally 2 times a day  glimepiride 2 mg oral tablet: 1 tab(s) orally once a day (at bedtime)  Jardiance 10 mg oral tablet: 1 tab(s) orally once a day  meloxicam 15 mg oral tablet: 1 tab(s) orally once a day as needed for  moderate pain  metFORMIN 1000 mg oral tablet: 1 tab(s) orally 2 times a day  metoprolol succinate 100 mg oral tablet, extended release: 1 tab(s) orally 2 times a day  Multaq 400 mg oral tablet: 1 tab(s) orally 2 times a day Take one tablet twice daily  multivitamin: 1 tab(s) once a day  Norvasc 10 mg oral tablet: 1 tab(s) orally once a day  omeprazole 20 mg oral delayed release tablet: 1 tab(s) orally once a day

## 2024-08-29 ENCOUNTER — APPOINTMENT (OUTPATIENT)
Dept: CARDIOLOGY | Facility: CLINIC | Age: 79
End: 2024-08-29
Payer: MEDICARE

## 2024-08-29 PROCEDURE — 93306 TTE W/DOPPLER COMPLETE: CPT

## 2024-09-04 PROBLEM — I10 ESSENTIAL (PRIMARY) HYPERTENSION: Chronic | Status: ACTIVE | Noted: 2024-08-22

## 2024-09-04 PROBLEM — I48.92 UNSPECIFIED ATRIAL FLUTTER: Chronic | Status: ACTIVE | Noted: 2024-08-22

## 2024-09-04 PROBLEM — E78.5 HYPERLIPIDEMIA, UNSPECIFIED: Chronic | Status: ACTIVE | Noted: 2024-08-22

## 2024-09-04 PROBLEM — E11.9 TYPE 2 DIABETES MELLITUS WITHOUT COMPLICATIONS: Chronic | Status: ACTIVE | Noted: 2024-08-22

## 2024-09-04 PROBLEM — M54.9 DORSALGIA, UNSPECIFIED: Chronic | Status: ACTIVE | Noted: 2024-08-22

## 2024-09-11 ENCOUNTER — OUTPATIENT (OUTPATIENT)
Dept: OUTPATIENT SERVICES | Facility: HOSPITAL | Age: 79
LOS: 1 days | End: 2024-09-11
Payer: MEDICARE

## 2024-09-11 VITALS
HEIGHT: 66 IN | SYSTOLIC BLOOD PRESSURE: 140 MMHG | TEMPERATURE: 97 F | RESPIRATION RATE: 16 BRPM | HEART RATE: 99 BPM | DIASTOLIC BLOOD PRESSURE: 80 MMHG | OXYGEN SATURATION: 97 % | WEIGHT: 194.89 LBS

## 2024-09-11 DIAGNOSIS — Z98.890 OTHER SPECIFIED POSTPROCEDURAL STATES: Chronic | ICD-10-CM

## 2024-09-11 DIAGNOSIS — Z01.818 ENCOUNTER FOR OTHER PREPROCEDURAL EXAMINATION: ICD-10-CM

## 2024-09-11 DIAGNOSIS — Z98.49 CATARACT EXTRACTION STATUS, UNSPECIFIED EYE: Chronic | ICD-10-CM

## 2024-09-11 DIAGNOSIS — Z96.641 PRESENCE OF RIGHT ARTIFICIAL HIP JOINT: Chronic | ICD-10-CM

## 2024-09-11 DIAGNOSIS — I48.91 UNSPECIFIED ATRIAL FIBRILLATION: ICD-10-CM

## 2024-09-11 LAB
ALBUMIN SERPL ELPH-MCNC: 4.2 G/DL — SIGNIFICANT CHANGE UP (ref 3.3–5.2)
ALP SERPL-CCNC: 54 U/L — SIGNIFICANT CHANGE UP (ref 40–120)
ALT FLD-CCNC: 19 U/L — SIGNIFICANT CHANGE UP
ANION GAP SERPL CALC-SCNC: 16 MMOL/L — SIGNIFICANT CHANGE UP (ref 5–17)
APTT BLD: 33.3 SEC — SIGNIFICANT CHANGE UP (ref 24.5–35.6)
AST SERPL-CCNC: 14 U/L — SIGNIFICANT CHANGE UP
BASOPHILS # BLD AUTO: 0.02 K/UL — SIGNIFICANT CHANGE UP (ref 0–0.2)
BASOPHILS NFR BLD AUTO: 0.3 % — SIGNIFICANT CHANGE UP (ref 0–2)
BILIRUB SERPL-MCNC: 0.4 MG/DL — SIGNIFICANT CHANGE UP (ref 0.4–2)
BLD GP AB SCN SERPL QL: SIGNIFICANT CHANGE UP
BUN SERPL-MCNC: 27.2 MG/DL — HIGH (ref 8–20)
CALCIUM SERPL-MCNC: 10.1 MG/DL — SIGNIFICANT CHANGE UP (ref 8.4–10.5)
CHLORIDE SERPL-SCNC: 103 MMOL/L — SIGNIFICANT CHANGE UP (ref 96–108)
CO2 SERPL-SCNC: 23 MMOL/L — SIGNIFICANT CHANGE UP (ref 22–29)
CREAT SERPL-MCNC: 0.88 MG/DL — SIGNIFICANT CHANGE UP (ref 0.5–1.3)
EGFR: 87 ML/MIN/1.73M2 — SIGNIFICANT CHANGE UP
EOSINOPHIL # BLD AUTO: 0.08 K/UL — SIGNIFICANT CHANGE UP (ref 0–0.5)
EOSINOPHIL NFR BLD AUTO: 1.3 % — SIGNIFICANT CHANGE UP (ref 0–6)
GLUCOSE SERPL-MCNC: 190 MG/DL — HIGH (ref 70–99)
HCT VFR BLD CALC: 42.6 % — SIGNIFICANT CHANGE UP (ref 39–50)
HGB BLD-MCNC: 14.7 G/DL — SIGNIFICANT CHANGE UP (ref 13–17)
IMM GRANULOCYTES NFR BLD AUTO: 0.3 % — SIGNIFICANT CHANGE UP (ref 0–0.9)
INR BLD: 1.2 RATIO — HIGH (ref 0.85–1.18)
LYMPHOCYTES # BLD AUTO: 2.57 K/UL — SIGNIFICANT CHANGE UP (ref 1–3.3)
LYMPHOCYTES # BLD AUTO: 41.1 % — SIGNIFICANT CHANGE UP (ref 13–44)
MAGNESIUM SERPL-MCNC: 1.8 MG/DL — SIGNIFICANT CHANGE UP (ref 1.8–2.6)
MCHC RBC-ENTMCNC: 29.6 PG — SIGNIFICANT CHANGE UP (ref 27–34)
MCHC RBC-ENTMCNC: 34.5 GM/DL — SIGNIFICANT CHANGE UP (ref 32–36)
MCV RBC AUTO: 85.7 FL — SIGNIFICANT CHANGE UP (ref 80–100)
MONOCYTES # BLD AUTO: 0.64 K/UL — SIGNIFICANT CHANGE UP (ref 0–0.9)
MONOCYTES NFR BLD AUTO: 10.2 % — SIGNIFICANT CHANGE UP (ref 2–14)
NEUTROPHILS # BLD AUTO: 2.92 K/UL — SIGNIFICANT CHANGE UP (ref 1.8–7.4)
NEUTROPHILS NFR BLD AUTO: 46.8 % — SIGNIFICANT CHANGE UP (ref 43–77)
PLATELET # BLD AUTO: 271 K/UL — SIGNIFICANT CHANGE UP (ref 150–400)
POTASSIUM SERPL-MCNC: 3.7 MMOL/L — SIGNIFICANT CHANGE UP (ref 3.5–5.3)
POTASSIUM SERPL-SCNC: 3.7 MMOL/L — SIGNIFICANT CHANGE UP (ref 3.5–5.3)
PROT SERPL-MCNC: 7 G/DL — SIGNIFICANT CHANGE UP (ref 6.6–8.7)
PROTHROM AB SERPL-ACNC: 13.2 SEC — HIGH (ref 9.5–13)
RBC # BLD: 4.97 M/UL — SIGNIFICANT CHANGE UP (ref 4.2–5.8)
RBC # FLD: 13 % — SIGNIFICANT CHANGE UP (ref 10.3–14.5)
SODIUM SERPL-SCNC: 142 MMOL/L — SIGNIFICANT CHANGE UP (ref 135–145)
WBC # BLD: 6.25 K/UL — SIGNIFICANT CHANGE UP (ref 3.8–10.5)
WBC # FLD AUTO: 6.25 K/UL — SIGNIFICANT CHANGE UP (ref 3.8–10.5)

## 2024-09-11 PROCEDURE — 93010 ELECTROCARDIOGRAM REPORT: CPT

## 2024-09-11 PROCEDURE — 85025 COMPLETE CBC W/AUTO DIFF WBC: CPT

## 2024-09-11 PROCEDURE — 36415 COLL VENOUS BLD VENIPUNCTURE: CPT

## 2024-09-11 PROCEDURE — 86850 RBC ANTIBODY SCREEN: CPT

## 2024-09-11 PROCEDURE — 80053 COMPREHEN METABOLIC PANEL: CPT

## 2024-09-11 PROCEDURE — 93005 ELECTROCARDIOGRAM TRACING: CPT

## 2024-09-11 PROCEDURE — 86900 BLOOD TYPING SEROLOGIC ABO: CPT

## 2024-09-11 PROCEDURE — G0463: CPT

## 2024-09-11 PROCEDURE — 85610 PROTHROMBIN TIME: CPT

## 2024-09-11 PROCEDURE — 83735 ASSAY OF MAGNESIUM: CPT

## 2024-09-11 PROCEDURE — 86901 BLOOD TYPING SEROLOGIC RH(D): CPT

## 2024-09-11 PROCEDURE — 85730 THROMBOPLASTIN TIME PARTIAL: CPT

## 2024-09-11 RX ORDER — APIXABAN 5 MG/1
1 TABLET, FILM COATED ORAL
Refills: 0 | DISCHARGE

## 2024-09-11 RX ORDER — EMPAGLIFLOZIN 10 MG/1
1 TABLET, FILM COATED ORAL
Refills: 0 | DISCHARGE

## 2024-09-11 RX ORDER — MELOXICAM 15 MG/1
1 TABLET ORAL
Refills: 0 | DISCHARGE

## 2024-09-11 RX ORDER — AMLODIPINE BESYLATE 10 MG/1
1 TABLET ORAL
Refills: 0 | DISCHARGE

## 2024-09-11 RX ORDER — OMEPRAZOLE 40 MG/1
1 CAPSULE, DELAYED RELEASE ORAL
Refills: 0 | DISCHARGE

## 2024-09-11 RX ORDER — METFORMIN HYDROCHLORIDE 850 MG/1
1 TABLET, FILM COATED ORAL
Refills: 0 | DISCHARGE

## 2024-09-11 RX ORDER — ROSUVASTATIN CALCIUM 10 MG/1
1 TABLET ORAL
Refills: 0 | DISCHARGE

## 2024-09-11 RX ORDER — GLIMEPIRIDE 1 MG/1
1 TABLET ORAL
Refills: 0 | DISCHARGE

## 2024-09-11 RX ORDER — LOSARTAN POTASSIUM 50 MG/1
1 TABLET ORAL
Refills: 0 | DISCHARGE

## 2024-09-11 NOTE — H&P PST ADULT - ASSESSMENT
79M with hx of former smoker (quit 35 years ago; smoked 5 years 1PPD; HTN, HLD, DM, Grade II diastolic dysfunction, carotid disease, recently uncontrolled diabetes, moderately dilated left atrium, right hip replacement (1990s); right shoulder surgery; b/l cataract surgery with persistent AF presents to [facility] today for scheduled CLARISA/DCCV on 9/17/24.     RISK ASSESSMENTS:  ASA: PER ANESTHESIA  MALLAMPATI: PER ANESTHESIA    PLAN:  -Patient scheduled for CLARISA/DCCV 9/17/24  -patient seen and examined at [facility] 9/11/24  -Patient instructed to remain NPO after midnight on day prior to procedure  -Hold Jardiance x 3 days prior to procedure  -Hold metformin and glimeperide on day of procedure   -Patient instructed to continue Eliquis uninterrupted  -EKG and labs reviewed  -Consent to be obtained on day of procedure by attending anesthesiologist and cardiologist.   ? 79M with hx of former smoker (quit 35 years ago; smoked 5 years 1PPD; HTN, HLD, DM, Grade II diastolic dysfunction, carotid disease, recently uncontrolled diabetes, moderately dilated left atrium, right hip replacement (1990s); right shoulder surgery; b/l cataract surgery with persistent AF presents to [facility] today for scheduled CLARISA/DCCV on 9/17/24.     RISK ASSESSMENTS:  ASA: PER ANESTHESIA  MALLAMPATI: PER ANESTHESIA    PLAN:  -Patient scheduled for CLARISA/DCCV 9/17/24  -patient seen and examined at [facility] 9/11/24  -Patient instructed to remain NPO after midnight on day prior to procedure with exception of very small sip of water with medications only  -Hold Jardiance x 3 days prior to procedure  -Hold metformin and glimepiride on day of procedure   -Patient instructed to continue Eliquis uninterrupted  -EKG and labs reviewed  -Consent to be obtained on day of procedure by attending anesthesiologist and cardiologist.   ?

## 2024-09-11 NOTE — H&P PST ADULT - NSICDXPASTSURGICALHX_GEN_ALL_CORE_FT
PAST SURGICAL HISTORY:  H/O shoulder surgery     History of right hip replacement     S/P cataract surgery

## 2024-09-12 ENCOUNTER — APPOINTMENT (OUTPATIENT)
Dept: CARDIOLOGY | Facility: CLINIC | Age: 79
End: 2024-09-12
Payer: MEDICARE

## 2024-09-12 PROCEDURE — A9500: CPT

## 2024-09-12 PROCEDURE — 93015 CV STRESS TEST SUPVJ I&R: CPT

## 2024-09-12 PROCEDURE — 78452 HT MUSCLE IMAGE SPECT MULT: CPT

## 2024-09-12 RX ORDER — AMINOPHYLLINE 25 MG/ML
25 INJECTION, SOLUTION INTRAVENOUS
Qty: 0 | Refills: 0 | Status: COMPLETED | OUTPATIENT
Start: 2024-09-12

## 2024-09-12 RX ORDER — REGADENOSON 0.08 MG/ML
0.4 INJECTION, SOLUTION INTRAVENOUS
Refills: 0 | Status: COMPLETED | OUTPATIENT
Start: 2024-09-12

## 2024-09-12 RX ADMIN — REGADENOSON 0 MG/5ML: 0.08 INJECTION, SOLUTION INTRAVENOUS at 00:00

## 2024-09-12 RX ADMIN — AMINOPHYLLINE 0 MG/ML: 25 INJECTION, SOLUTION INTRAVENOUS at 00:00

## 2024-09-17 ENCOUNTER — RESULT REVIEW (OUTPATIENT)
Age: 79
End: 2024-09-17

## 2024-09-17 ENCOUNTER — TRANSCRIPTION ENCOUNTER (OUTPATIENT)
Age: 79
End: 2024-09-17

## 2024-09-17 ENCOUNTER — OUTPATIENT (OUTPATIENT)
Dept: OUTPATIENT SERVICES | Facility: HOSPITAL | Age: 79
LOS: 1 days | End: 2024-09-17
Payer: MEDICARE

## 2024-09-17 VITALS
DIASTOLIC BLOOD PRESSURE: 74 MMHG | SYSTOLIC BLOOD PRESSURE: 126 MMHG | HEART RATE: 71 BPM | OXYGEN SATURATION: 98 % | RESPIRATION RATE: 12 BRPM

## 2024-09-17 VITALS
OXYGEN SATURATION: 95 % | HEART RATE: 135 BPM | SYSTOLIC BLOOD PRESSURE: 161 MMHG | RESPIRATION RATE: 12 BRPM | DIASTOLIC BLOOD PRESSURE: 99 MMHG | TEMPERATURE: 98 F

## 2024-09-17 DIAGNOSIS — I48.91 UNSPECIFIED ATRIAL FIBRILLATION: ICD-10-CM

## 2024-09-17 DIAGNOSIS — Z96.641 PRESENCE OF RIGHT ARTIFICIAL HIP JOINT: Chronic | ICD-10-CM

## 2024-09-17 DIAGNOSIS — Z98.49 CATARACT EXTRACTION STATUS, UNSPECIFIED EYE: Chronic | ICD-10-CM

## 2024-09-17 DIAGNOSIS — Z98.890 OTHER SPECIFIED POSTPROCEDURAL STATES: Chronic | ICD-10-CM

## 2024-09-17 LAB — GLUCOSE BLDC GLUCOMTR-MCNC: 175 MG/DL — HIGH (ref 70–99)

## 2024-09-17 PROCEDURE — 92960 CARDIOVERSION ELECTRIC EXT: CPT

## 2024-09-17 PROCEDURE — 82962 GLUCOSE BLOOD TEST: CPT

## 2024-09-17 PROCEDURE — 93320 DOPPLER ECHO COMPLETE: CPT

## 2024-09-17 PROCEDURE — 93325 DOPPLER ECHO COLOR FLOW MAPG: CPT

## 2024-09-17 PROCEDURE — 93312 ECHO TRANSESOPHAGEAL: CPT

## 2024-09-17 PROCEDURE — 93005 ELECTROCARDIOGRAM TRACING: CPT

## 2024-09-17 PROCEDURE — 93010 ELECTROCARDIOGRAM REPORT: CPT

## 2024-09-17 RX ORDER — OMEPRAZOLE 40 MG/1
1 CAPSULE, DELAYED RELEASE ORAL
Refills: 0 | DISCHARGE

## 2024-09-17 RX ORDER — PSYLLIUM HUSK 0.4 G
0 CAPSULE ORAL
Refills: 0 | DISCHARGE

## 2024-09-17 RX ORDER — AMLODIPINE BESYLATE 10 MG/1
1 TABLET ORAL
Refills: 0 | DISCHARGE

## 2024-09-17 RX ORDER — FOLIC ACID/MULTIVIT,IRON,MINER 0.4MG-18MG
1 TABLET,CHEWABLE ORAL
Refills: 0 | DISCHARGE

## 2024-09-17 RX ADMIN — Medication 25 GRAM(S): at 09:23

## 2024-09-17 NOTE — PROGRESS NOTE ADULT - SUBJECTIVE AND OBJECTIVE BOX
Pt doing well s/p uncomplicated CLARISA & DCCV. Denies complaint.     Exam:   VSS, NAD, A&O x 3  Skin: no erythema/edema/blistering at defib pad sites.   Card: S1/S2, RRR, no m/g/r  Resp: lungs CTA b/l  Abd: S/NT/ND  Ext: no edema, distal pulses intact    Post procedure VS  HR 68 bpm  /55 mmHG  SpO2 98% on RA    ECG- SR 69bpm with PVCs, DANIELE 160ms, QRSD 88ms    Assessment:   80 y/o male with HTN, HLD, HTN, recent diagnosis of PAF (on 7/26/24 EKG showed AF @ 114bpm, narrow QRS and confirmed on outpatient monitor) and recent hospital admission on 8/22 for Atrial flutter with RVR in 150s.  Pt has been primarily asymptomatic but was found to in AFlutter w/ RVR by PCP and was sent to ED.  Pt was on home dose of BB at that time, and received IV Metoprolol x 1 in ED and converted to sinus rhythm overnight with no significant conversion pauses or bradyarrhythmia.  Today, pt presented electively is now s/p CLARISA negative for PIETRO thrombus and uncomplicated DCCV with restoration of sinus rhythm with Dr Reed Beckett ([facility]).      Plan:   Observation on telemetry per post op protocol.    Resume PO intake.   Ambulate w/ assist once fully awake & back to baseline mental status w/ VSS.  Continue Eliquis 5mg BID. Importance of strict compliance with anticoagulation regimen reinforced with pt.   Resume other home medications.   Anticipate d/c home once all criteria met, with outpt f/up in 1 month with Dr Palmer discuss AF management.

## 2024-09-17 NOTE — DISCHARGE NOTE PROVIDER - NSDCMRMEDTOKEN_GEN_ALL_CORE_FT
amLODIPine 10 mg oral tablet: 1 tab(s) orally once a day  centrum silver: 1 tablet once a day  Cozaar 100 mg oral tablet: 1 tab(s) orally once a day  Crestor 5 mg oral tablet: 1 tab(s) orally once a day (at bedtime)  D3 25 mcg (1000 intl units) oral tablet: 1 tab(s) orally once a day  Eliquis 5 mg oral tablet: 1 tab(s) orally 2 times a day  glimepiride 2 mg oral tablet: 1 tab(s) orally once a day (at bedtime)  Jardiance 10 mg oral tablet: 1 tab(s) orally once a day  meloxicam 15 mg oral tablet: 1 tab(s) orally once a day as needed for  moderate pain  metFORMIN 1000 mg oral tablet: 1 tab(s) orally 2 times a day  metoprolol succinate 100 mg oral tablet, extended release: 1 tab(s) orally 2 times a day  omeprazole 40 mg oral delayed release capsule: 1 cap(s) orally once a day

## 2024-09-17 NOTE — ASU PATIENT PROFILE, ADULT - TEACHING/LEARNING FACTORS IMPACT ABILITY TO LEARN
Pt. Here for NOB visit.  # [phone]  Patient was seen at [facility] ER x 2 for abdominal pain, chest pain and lower abd. Pain.   Pt. States no complaints.   Pharmacy verified.   Repeat C/S consent form signed 1/31/2020  Chaperone offered and not needed.        none

## 2024-09-17 NOTE — DISCHARGE NOTE PROVIDER - HOSPITAL COURSE
78 y/o male with HTN, HLD, HTN, recent diagnosis of PAF (on 7/26/24 EKG showed AF @ 114bpm, narrow QRS and confirmed on outpatient monitor) and recent hospital admission on 8/22 for Atrial flutter with RVR in 150s.  Pt has been primarily asymptomatic but was found to in AFlutter w/ RVR by PCP and was sent to ED.  Pt was on home dose of BB at that time, and received IV Metoprolol x 1 in ED and converted to sinus rhythm overnight with no significant conversion pauses or bradyarrhythmia.  Today, pt presented electively is now s/p CLARISA negative for PIETRO thrombus and uncomplicated DCCV with restoration of sinus rhythm with Dr Reed Beckett ([facility]).  The patient was observed per post procedure protocol, then discharged home with a plan for outpatient follow up.

## 2024-09-17 NOTE — DISCHARGE NOTE PROVIDER - NSDCFUADDINST_GEN_ALL_CORE_FT
Follow up with the [facility] team in 4-6 weeks. Our office will contact you in 3-5 days to schedule this appointment. Please call [phone] with questions or concerns. Follow up with Dr Palmer on 11/12/2024. Please call [phone] with questions or concerns.

## 2024-09-17 NOTE — ASU PATIENT PROFILE, ADULT - FALL HARM RISK - TYPE OF ASSESSMENT
"SLEEP STUDY INTERPRETATION  DIAGNOSTIC POLYSOMNOGRAPHY REPORT      Patient: NOELLE HU  YOB: 1944  Study Date: 4/4/2018  MRN: [id number]  Referring Provider: MD Olvera Jane  Ordering Provider: MD Shepard Rakesh    Indications for Polysomnography: The patient is a 73 y old Female who is 5' 5\" and weighs 221.0 lbs. Her BMI is 36.8, Jasper sleepiness scale 3.0 and neck circumference is 40.0 cm. Relevant medical history includes hypertension. A diagnostic polysomnogram was performed to evaluate for sleep apnea.    Polysomnogram Data: A full night polysomnogram recorded the standard physiologic parameters including EEG, EOG, EMG, ECG, nasal and oral airflow. Respiratory parameters of chest and abdominal movements were recorded with respiratory inductance plethysmography. Oxygen saturation was recorded by pulse oximetry. Hypopnea scoring rule used: 1B 4%.    Sleep Architecture: Sleep was fragmented.   The total recording time of the polysomnogram was 481.5 minutes. The total sleep time was 336.0 minutes. Sleep latency was normal at 16.2 minutes with the use of a sleep aid (Zaleplon 5 mg ). REM latency was 219.5 minutes. Arousal index was increased at 61.3 arousals per hour. Sleep efficiency was decreased at 69.8%. Wake after sleep onset was 128.0 minutes. The patient spent 29.3% of total sleep time in Stage N1, 54.6% in Stage N2, 2.4% in Stage N3, and 13.7% in REM. Time in REM supine was - minutes.    Respiration: Severe obstructive sleep apnea.     Events ? The polysomnogram revealed a presence of 45 obstructive, - central, and - mixed apneas resulting in an apnea index of 8.0 events per hour. There were 150 obstructive hypopneas and - central hypopneas resulting in an obstructive hypopnea index of 26.8 and central hypopnea index of - events per hour. The combined apnea/hypopnea index was 34.8 events per hour (central apnea/hypopnea index was - events per hour). The REM AHI was 60.0 events per hour. " The supine AHI was 65.8 events per hour. The RERA index was 4.8 events per hour.  The RDI was 39.6 events per hour.    Snoring - was reported as loud.    Respiratory rate and pattern - was notable for normal respiratory rate and pattern.    Sustained Sleep Associated Hypoventilation - Transcutaneous carbon dioxide monitoring was not used, however significant hypoventilation was not suggested by oximetry.    Sleep Associated Hypoxemia - (Greater than 5 minutes O2 sat at or below 88%) was present. Baseline oxygen saturation was 91.9%. Lowest oxygen saturation was 76.6%. Time spent less than or equal to 88% was 26.4 minutes. Time spent less than or equal to 89% was 50.6 minutes.    Movement Activity: There was no significant movement abnormality.     Periodic Limb Activity - There were - PLMs during the entire study. The PLM index was - movements per hour. The PLM Arousal Index was - per hour.    REM EMG Activity - Excessive transient/sustained muscle activity was not present.    Nocturnal Behavior - Abnormal sleep related behaviors were not noted during/arising out of NREM / REM sleep.     Bruxism - None apparent.    Cardiac Summary: Normal sinus rhythm.   The average pulse rate was 75.9 bpm. The minimum pulse rate was 58.9 bpm while the maximum pulse rate was 104.9 bpm.  Arrhythmias were not noted.      Assessment:     This sleep study shows severe obstructive sleep apnea associated with sleep fragmentation and hypoxemia.     Recommendations:    CPAP therapy is the treatment of choice fro severe obstructive sleep apnea. Treatment could be empirically initiated with Auto?titrating PAP therapy with a range of 5 to 15 cmH2O. Recommend clinical follow up with sleep management team.    If CPAP is not accepted or tolerated, oral appliance therapy or upper airway surgery are alternate considerations. Patient may be a candidate for dental appliance through referral to Sleep Dentistry for the treatment of obstructive sleep  apnea.    If devices are not acceptable or effective, patient may benefit from evaluation of possible surgical options. If she is interested, would recommend referral to specialized ENT-Sleep provider.    Weight management.     Diagnostic Codes:   Obstructive Sleep Apnea G47.33  Sleep Hypoxemia G47.36   Repetitive Intrusions Into Sleep F51.8    4/4/2018 [place] [facility] Sleep Study (221.0 lbs) - AHI 34.8, RDI 39.6, Supine AHI 65.8, REM AHI 60.0, Low O2 76.6%, Time Spent ?88% 26.4 minutes / Time Spent ?89% 50.6 minutes.     _____________________________________   Electronically Signed By: Ian Shepard MD 04/08/2018           Range(%) Time in range (min)   0.0 - 89.0 50.6   0.0 - 88.0 26.4         Stage Min(mm Hg) Max(mm Hg)   Wake - -   NREM(1+2+3) - -   REM - -       Range(mmHg) Time in range (min)   - -   - -        Admission

## 2024-09-17 NOTE — DISCHARGE NOTE PROVIDER - CARE PROVIDER_API CALL
Bob Palmer [facility]  Cardiac Electrophysiology  [address], [address]  [place] [address]  Phone: [phone]  Fax: [phone]  Follow Up Time:

## 2024-09-17 NOTE — DISCHARGE NOTE PROVIDER - NSDCCPTREATMENT_GEN_ALL_CORE_FT
PRINCIPAL PROCEDURE  Procedure: Transesophageal echocardiography (CLARISA) with external cardioverison  Findings and Treatment: -Take each dose of your anticoagulation medication (blood thinner) exactly as prescribed.   - Do not drive, operate heavy machinery or make important decisions for 24 hours following the procedure.  - You may resume all other activities the day after the procedure.  Call your doctor if:   - your rapid heart rhythm returns.  - you have any questions or concerns regarding the procedure.  If you experience increased difficulty breathing or chest pain, or if you faint or have dizzy spells, please seek immediate medical attention.

## 2024-09-17 NOTE — DISCHARGE NOTE NURSING/CASE MANAGEMENT/SOCIAL WORK - NSDCPEFALRISK_GEN_ALL_CORE
For information on Fall & Injury Prevention, visit: [email or web address] OR  [email or web address] OR  https://www.cdc.gov/steadi/patient.html

## 2024-09-17 NOTE — DISCHARGE NOTE NURSING/CASE MANAGEMENT/SOCIAL WORK - PATIENT PORTAL LINK FT
You can access the FollowMyHealth Patient Portal offered by [facility] by registering at the following website: [email or web address]. By joining [employer]’s FollowMyHealth portal, you will also be able to view your health information using other applications (apps) compatible with our system.

## 2024-09-17 NOTE — PROGRESS NOTE ADULT - SUBJECTIVE AND OBJECTIVE BOX
78 y/o male with HTN, HLD, HTN, recent diagnosis of PAF (on 7/26/24. EKG showed AF @ 114bpm, narrow QRS and confirmed on outpatient monitor) and recent hospital admission on 8/22 for Atrial flutter with RVR in 150s.  Pt has been primarily asymptomatic but was found to in AFlutter w/ RVR by PCP and sent to ED.  Pt was on home dose of BB at that time, and received IV Metoprolol x 1 in ED and converted to sinus rhythm overnight with no significant conversion pauses or bradyarrhythmia.  Pt now presents for elective CLARISA/DCCV with Dr Reed Beckett ([facility]).  PST preformed on 9/11/24 with no interval changes.  Pt has been on Toprol which was increased to 100mg BID for better rate control and on Eliquis 5mg BID (tolerating without bleeding events).  Pt reports strict compliance with anticoagulation regiment with last dose of eliquis being this morning at 8am.  Pt remains in AF/AFL with HR ~120bpm on telemetry monitor.  NPO confirmed.        Cardiology Summary    ECG 9/11/24 Afib rate 103    TTE 8/29/24 TTE:  LVEF 65-70%; no RWMA;  Elevated left ventricular filling pressure;  Normal right ventricular cavity size and normal right ventricular systolic function; mild TR           80 y/o male with HTN, HLD, HTN, recent diagnosis of PAF (on 7/26/24. EKG showed AF @ 114bpm, narrow QRS and confirmed on outpatient monitor) and recent hospital admission on 8/22 for Atrial flutter with RVR in 150s.  Pt has been primarily asymptomatic but was found to in AFlutter w/ RVR by PCP and sent to ED.  Pt was on home dose of BB at that time, and received IV Metoprolol x 1 in ED and converted to sinus rhythm overnight with no significant conversion pauses or bradyarrhythmia.  Pt now presents for elective CLARISA/DCCV with Dr Reed Beckett ([facility]).  PST preformed on 9/11/24 with no interval changes.  Pt has been on Toprol which was increased to 100mg BID for better rate control and on Eliquis 5mg BID (tolerating without bleeding events).  Pt reports strict compliance with anticoagulation regiment with last dose of Eliquis being this morning at 8am.  Pt is also on Jardiance which has been held (last dose on 9/13) and Metformin and glimepiride which have also been held (last doses yesterday) in anticipation of today's procedure.  Pt remains in AF/AFL with HR ~120bpm on telemetry monitor.  NPO confirmed.        Cardiology Summary    ECG 9/11/24 Afib rate 103    TTE 8/29/24 TTE:  LVEF 65-70%; no RWMA;  Elevated left ventricular filling pressure;  Normal right ventricular cavity size and normal right ventricular systolic function; mild TR

## 2024-09-17 NOTE — DISCHARGE NOTE PROVIDER - NSDCFUSCHEDAPPT_GEN_ALL_CORE_FT
Reed Danielle  [facility] Physician [facility]  CARDIOLOGY [address]  Scheduled Appointment: 10/31/2024    Bob Palmer  [facility] Physician [facility]  ELECTROPH [address]  Scheduled Appointment: 11/12/2024

## 2024-09-24 ENCOUNTER — OFFICE (OUTPATIENT)
Dept: URBAN - METROPOLITAN AREA CLINIC 94 | Facility: CLINIC | Age: 79
Setting detail: OPHTHALMOLOGY
End: 2024-09-24
Payer: COMMERCIAL

## 2024-09-24 DIAGNOSIS — E11.3511: ICD-10-CM

## 2024-09-24 PROCEDURE — 67210 TREATMENT OF RETINAL LESION: CPT | Mod: RT | Performed by: OPHTHALMOLOGY

## 2024-10-08 ENCOUNTER — OFFICE (OUTPATIENT)
Dept: URBAN - METROPOLITAN AREA CLINIC 94 | Facility: CLINIC | Age: 79
Setting detail: OPHTHALMOLOGY
End: 2024-10-08
Payer: COMMERCIAL

## 2024-10-08 DIAGNOSIS — E11.3312: ICD-10-CM

## 2024-10-08 PROCEDURE — 67210 TREATMENT OF RETINAL LESION: CPT | Mod: 79,LT | Performed by: OPHTHALMOLOGY

## 2024-10-08 ASSESSMENT — KERATOMETRY
OD_AXISANGLE_DEGREES: 128
OD_K1POWER_DIOPTERS: 43.16
OS_K2POWER_DIOPTERS: 44.00
OS_K1POWER_DIOPTERS: 42.08
OS_AXISANGLE_DEGREES: 017
OD_K2POWER_DIOPTERS: 43.38

## 2024-10-08 ASSESSMENT — REFRACTION_AUTOREFRACTION
OS_CYLINDER: -1.75
OD_AXIS: 083
OS_SPHERE: +1.25
OS_AXIS: 102
OD_CYLINDER: -1.00
OD_SPHERE: +0.50

## 2024-10-08 ASSESSMENT — TONOMETRY
OS_IOP_MMHG: 13
OD_IOP_MMHG: 16

## 2024-10-08 ASSESSMENT — VISUAL ACUITY
OD_BCVA: 20/20-1
OS_BCVA: 20/60-1

## 2024-10-08 ASSESSMENT — CONFRONTATIONAL VISUAL FIELD TEST (CVF)
OS_FINDINGS: FULL
OD_FINDINGS: FULL

## 2024-10-15 ENCOUNTER — OFFICE (OUTPATIENT)
Dept: URBAN - METROPOLITAN AREA CLINIC 94 | Facility: CLINIC | Age: 79
Setting detail: OPHTHALMOLOGY
End: 2024-10-15
Payer: COMMERCIAL

## 2024-10-15 DIAGNOSIS — E11.3513: ICD-10-CM

## 2024-10-15 DIAGNOSIS — H35.341: ICD-10-CM

## 2024-10-15 PROCEDURE — 67028 INJECTION EYE DRUG: CPT | Mod: 58,50 | Performed by: OPHTHALMOLOGY

## 2024-10-15 PROCEDURE — 92134 CPTRZ OPH DX IMG PST SGM RTA: CPT | Performed by: OPHTHALMOLOGY

## 2024-10-15 ASSESSMENT — CONFRONTATIONAL VISUAL FIELD TEST (CVF)
OS_FINDINGS: FULL
OD_FINDINGS: FULL

## 2024-10-15 ASSESSMENT — KERATOMETRY
OD_K2POWER_DIOPTERS: 43.38
OS_K2POWER_DIOPTERS: 44.00
OD_K1POWER_DIOPTERS: 43.16
OD_AXISANGLE_DEGREES: 128
OS_AXISANGLE_DEGREES: 017
OS_K1POWER_DIOPTERS: 42.08

## 2024-10-15 ASSESSMENT — VISUAL ACUITY
OS_BCVA: 20/60
OD_BCVA: 20/25

## 2024-10-15 ASSESSMENT — TONOMETRY
OS_IOP_MMHG: 12
OD_IOP_MMHG: 13

## 2024-10-15 ASSESSMENT — REFRACTION_AUTOREFRACTION
OD_CYLINDER: -1.00
OS_AXIS: 102
OD_SPHERE: +0.50
OS_CYLINDER: -1.75
OS_SPHERE: +1.25
OD_AXIS: 083

## 2024-10-31 ENCOUNTER — APPOINTMENT (OUTPATIENT)
Dept: CARDIOLOGY | Facility: CLINIC | Age: 79
End: 2024-10-31
Payer: MEDICARE

## 2024-10-31 ENCOUNTER — NON-APPOINTMENT (OUTPATIENT)
Age: 79
End: 2024-10-31

## 2024-10-31 VITALS
DIASTOLIC BLOOD PRESSURE: 91 MMHG | HEIGHT: 67 IN | BODY MASS INDEX: 31.23 KG/M2 | HEART RATE: 91 BPM | RESPIRATION RATE: 16 BRPM | SYSTOLIC BLOOD PRESSURE: 154 MMHG | WEIGHT: 199 LBS

## 2024-10-31 DIAGNOSIS — I10 ESSENTIAL (PRIMARY) HYPERTENSION: ICD-10-CM

## 2024-10-31 DIAGNOSIS — E11.9 TYPE 2 DIABETES MELLITUS W/OUT COMPLICATIONS: ICD-10-CM

## 2024-10-31 DIAGNOSIS — E66.9 OBESITY, UNSPECIFIED: ICD-10-CM

## 2024-10-31 DIAGNOSIS — I48.91 UNSPECIFIED ATRIAL FIBRILLATION: ICD-10-CM

## 2024-10-31 DIAGNOSIS — I50.30 UNSPECIFIED DIASTOLIC (CONGESTIVE) HEART FAILURE: ICD-10-CM

## 2024-10-31 PROCEDURE — 99214 OFFICE O/P EST MOD 30 MIN: CPT

## 2024-10-31 PROCEDURE — 93000 ELECTROCARDIOGRAM COMPLETE: CPT

## 2024-10-31 PROCEDURE — G2211 COMPLEX E/M VISIT ADD ON: CPT

## 2024-10-31 RX ORDER — TIRZEPATIDE 7.5 MG/.5ML
INJECTION, SOLUTION SUBCUTANEOUS
Refills: 0 | Status: ACTIVE | COMMUNITY

## 2024-11-11 DIAGNOSIS — I10 ESSENTIAL (PRIMARY) HYPERTENSION: ICD-10-CM

## 2024-11-12 ENCOUNTER — NON-APPOINTMENT (OUTPATIENT)
Age: 79
End: 2024-11-12

## 2024-11-12 ENCOUNTER — APPOINTMENT (OUTPATIENT)
Dept: ELECTROPHYSIOLOGY | Facility: CLINIC | Age: 79
End: 2024-11-12
Payer: MEDICARE

## 2024-11-12 VITALS
WEIGHT: 200 LBS | DIASTOLIC BLOOD PRESSURE: 90 MMHG | OXYGEN SATURATION: 97 % | BODY MASS INDEX: 31.39 KG/M2 | SYSTOLIC BLOOD PRESSURE: 152 MMHG | HEART RATE: 81 BPM | HEIGHT: 67 IN

## 2024-11-12 VITALS — DIASTOLIC BLOOD PRESSURE: 76 MMHG | SYSTOLIC BLOOD PRESSURE: 164 MMHG

## 2024-11-12 DIAGNOSIS — I48.91 UNSPECIFIED ATRIAL FIBRILLATION: ICD-10-CM

## 2024-11-12 PROCEDURE — 99205 OFFICE O/P NEW HI 60 MIN: CPT | Mod: 25

## 2024-11-12 PROCEDURE — 93000 ELECTROCARDIOGRAM COMPLETE: CPT

## 2024-11-12 RX ORDER — SPIRONOLACTONE 25 MG/1
25 TABLET ORAL DAILY
Qty: 90 | Refills: 1 | Status: ACTIVE | COMMUNITY
Start: 2024-11-12 | End: 1900-01-01

## 2024-11-19 ENCOUNTER — OFFICE (OUTPATIENT)
Dept: URBAN - METROPOLITAN AREA CLINIC 94 | Facility: CLINIC | Age: 79
Setting detail: OPHTHALMOLOGY
End: 2024-11-19
Payer: COMMERCIAL

## 2024-11-19 DIAGNOSIS — H35.3112: ICD-10-CM

## 2024-11-19 DIAGNOSIS — E11.3312: ICD-10-CM

## 2024-11-19 DIAGNOSIS — E11.3513: ICD-10-CM

## 2024-11-19 PROCEDURE — 92134 CPTRZ OPH DX IMG PST SGM RTA: CPT | Performed by: OPHTHALMOLOGY

## 2024-11-19 PROCEDURE — 67028 INJECTION EYE DRUG: CPT | Mod: 58,LT | Performed by: OPHTHALMOLOGY

## 2024-11-19 ASSESSMENT — TONOMETRY
OS_IOP_MMHG: 13
OD_IOP_MMHG: 16

## 2024-11-19 ASSESSMENT — KERATOMETRY
OD_K1POWER_DIOPTERS: 43.16
OS_AXISANGLE_DEGREES: 017
OD_AXISANGLE_DEGREES: 128
OS_K1POWER_DIOPTERS: 42.08
OS_K2POWER_DIOPTERS: 44.00
OD_K2POWER_DIOPTERS: 43.38

## 2024-11-19 ASSESSMENT — VISUAL ACUITY
OS_BCVA: 20/50+1
OD_BCVA: 20/20

## 2024-11-19 ASSESSMENT — REFRACTION_AUTOREFRACTION
OD_SPHERE: +0.50
OD_CYLINDER: -1.00
OS_SPHERE: +1.25
OD_AXIS: 083
OS_CYLINDER: -1.75
OS_AXIS: 102

## 2024-11-19 ASSESSMENT — CONFRONTATIONAL VISUAL FIELD TEST (CVF)
OS_FINDINGS: FULL
OD_FINDINGS: FULL

## 2025-01-07 ENCOUNTER — OFFICE (OUTPATIENT)
Dept: URBAN - METROPOLITAN AREA CLINIC 94 | Facility: CLINIC | Age: 80
Setting detail: OPHTHALMOLOGY
End: 2025-01-07
Payer: COMMERCIAL

## 2025-01-07 DIAGNOSIS — E11.3511: ICD-10-CM

## 2025-01-07 PROCEDURE — 67210 TREATMENT OF RETINAL LESION: CPT | Mod: RT | Performed by: OPHTHALMOLOGY

## 2025-01-07 ASSESSMENT — VISUAL ACUITY
OS_BCVA: 20/50
OD_BCVA: 20/20

## 2025-01-07 ASSESSMENT — TONOMETRY
OS_IOP_MMHG: 17
OD_IOP_MMHG: 14

## 2025-01-07 ASSESSMENT — REFRACTION_AUTOREFRACTION
OD_SPHERE: +0.50
OD_AXIS: 083
OS_CYLINDER: -1.75
OD_CYLINDER: -1.00
OS_AXIS: 102
OS_SPHERE: +1.25

## 2025-01-07 ASSESSMENT — KERATOMETRY
OS_K1POWER_DIOPTERS: 42.08
OS_K2POWER_DIOPTERS: 44.00
OD_K1POWER_DIOPTERS: 43.16
OD_AXISANGLE_DEGREES: 128
OS_AXISANGLE_DEGREES: 017
OD_K2POWER_DIOPTERS: 43.38

## 2025-01-07 ASSESSMENT — CONFRONTATIONAL VISUAL FIELD TEST (CVF)
OD_FINDINGS: FULL
OS_FINDINGS: FULL

## 2025-01-09 ENCOUNTER — OUTPATIENT (OUTPATIENT)
Dept: OUTPATIENT SERVICES | Facility: HOSPITAL | Age: 80
LOS: 1 days | End: 2025-01-09
Payer: MEDICARE

## 2025-01-09 VITALS
OXYGEN SATURATION: 98 % | RESPIRATION RATE: 16 BRPM | DIASTOLIC BLOOD PRESSURE: 80 MMHG | TEMPERATURE: 97 F | SYSTOLIC BLOOD PRESSURE: 160 MMHG | HEIGHT: 66 IN | HEART RATE: 97 BPM | WEIGHT: 199.74 LBS

## 2025-01-09 DIAGNOSIS — Z96.641 PRESENCE OF RIGHT ARTIFICIAL HIP JOINT: Chronic | ICD-10-CM

## 2025-01-09 DIAGNOSIS — Z98.890 OTHER SPECIFIED POSTPROCEDURAL STATES: Chronic | ICD-10-CM

## 2025-01-09 DIAGNOSIS — I48.91 UNSPECIFIED ATRIAL FIBRILLATION: ICD-10-CM

## 2025-01-09 DIAGNOSIS — Z98.49 CATARACT EXTRACTION STATUS, UNSPECIFIED EYE: Chronic | ICD-10-CM

## 2025-01-09 DIAGNOSIS — Z01.818 ENCOUNTER FOR OTHER PREPROCEDURAL EXAMINATION: ICD-10-CM

## 2025-01-09 LAB
ANION GAP SERPL CALC-SCNC: 13 MMOL/L — SIGNIFICANT CHANGE UP (ref 5–17)
APTT BLD: 33 SEC — SIGNIFICANT CHANGE UP (ref 24.5–35.6)
BLD GP AB SCN SERPL QL: SIGNIFICANT CHANGE UP
BUN SERPL-MCNC: 22.2 MG/DL — HIGH (ref 8–20)
CALCIUM SERPL-MCNC: 9.5 MG/DL — SIGNIFICANT CHANGE UP (ref 8.4–10.5)
CHLORIDE SERPL-SCNC: 101 MMOL/L — SIGNIFICANT CHANGE UP (ref 96–108)
CO2 SERPL-SCNC: 25 MMOL/L — SIGNIFICANT CHANGE UP (ref 22–29)
CREAT SERPL-MCNC: 0.8 MG/DL — SIGNIFICANT CHANGE UP (ref 0.5–1.3)
EGFR: 90 ML/MIN/1.73M2 — SIGNIFICANT CHANGE UP
GLUCOSE SERPL-MCNC: 167 MG/DL — HIGH (ref 70–99)
HCT VFR BLD CALC: 38.5 % — LOW (ref 39–50)
HGB BLD-MCNC: 12.7 G/DL — LOW (ref 13–17)
INR BLD: 1.33 RATIO — HIGH (ref 0.85–1.16)
MAGNESIUM SERPL-MCNC: 1.6 MG/DL — LOW (ref 1.8–2.6)
MCHC RBC-ENTMCNC: 28.2 PG — SIGNIFICANT CHANGE UP (ref 27–34)
MCHC RBC-ENTMCNC: 33 G/DL — SIGNIFICANT CHANGE UP (ref 32–36)
MCV RBC AUTO: 85.6 FL — SIGNIFICANT CHANGE UP (ref 80–100)
PLATELET # BLD AUTO: 245 K/UL — SIGNIFICANT CHANGE UP (ref 150–400)
POTASSIUM SERPL-MCNC: 4.3 MMOL/L — SIGNIFICANT CHANGE UP (ref 3.5–5.3)
POTASSIUM SERPL-SCNC: 4.3 MMOL/L — SIGNIFICANT CHANGE UP (ref 3.5–5.3)
PROTHROM AB SERPL-ACNC: 15.4 SEC — HIGH (ref 9.9–13.4)
RBC # BLD: 4.5 M/UL — SIGNIFICANT CHANGE UP (ref 4.2–5.8)
RBC # FLD: 13.2 % — SIGNIFICANT CHANGE UP (ref 10.3–14.5)
SODIUM SERPL-SCNC: 138 MMOL/L — SIGNIFICANT CHANGE UP (ref 135–145)
WBC # BLD: 7.48 K/UL — SIGNIFICANT CHANGE UP (ref 3.8–10.5)
WBC # FLD AUTO: 7.48 K/UL — SIGNIFICANT CHANGE UP (ref 3.8–10.5)

## 2025-01-09 PROCEDURE — 83735 ASSAY OF MAGNESIUM: CPT

## 2025-01-09 PROCEDURE — 85730 THROMBOPLASTIN TIME PARTIAL: CPT

## 2025-01-09 PROCEDURE — 86850 RBC ANTIBODY SCREEN: CPT

## 2025-01-09 PROCEDURE — 85610 PROTHROMBIN TIME: CPT

## 2025-01-09 PROCEDURE — 80048 BASIC METABOLIC PNL TOTAL CA: CPT

## 2025-01-09 PROCEDURE — 36415 COLL VENOUS BLD VENIPUNCTURE: CPT

## 2025-01-09 PROCEDURE — 86900 BLOOD TYPING SEROLOGIC ABO: CPT

## 2025-01-09 PROCEDURE — 93010 ELECTROCARDIOGRAM REPORT: CPT

## 2025-01-09 PROCEDURE — 86901 BLOOD TYPING SEROLOGIC RH(D): CPT

## 2025-01-09 PROCEDURE — 93005 ELECTROCARDIOGRAM TRACING: CPT

## 2025-01-09 PROCEDURE — G0463: CPT

## 2025-01-09 PROCEDURE — 85027 COMPLETE CBC AUTOMATED: CPT

## 2025-01-09 NOTE — H&P PST ADULT - HISTORY OF PRESENT ILLNESS
Department of Cardiology                                                                  [facility]/[place] [address] [address]                                                            Telephone: [phone]. Fax:[phone]                                                                             Pre-EP Procedure H and P    HPI:79-year-old male presenting for hospital follow up. The patient has a history of HTN, HLD, carotid disease, diabetes, history knee and hip surgery, lumbar radiculopathy and persistent atrial fibrillation. The patient was first diagnosed with atrial fibrillation in 2024 on a routine ECG prior to planned spinal surgery. He was started on metoprolol and Eliquis. He underwent cardioversion on 24. He was noted to be back in atrial fibrillation on ambulatory monitoring in 10/2024. He is currently maintained on Eliquis 5 mg bid and metoprolol 100 mg bid. He notes occasional shortness of breath and tiredness. He denies any palpitations or dizziness. Most recent TTE reported an EF of 65-70% and a moderately dilated left atrium. He is referred for an ablation procedure.  ?  Cardiology Summary  EC24:atrial fibrillation, 89 bpm  23: NSR  23: NSR  3/28/24: NSR  24: New Onset Atrial Fibrillation with RVR      Echo: TTETEE 24: 1. Left ventricular systolic function is normal with an ejection fraction visually estimated at 60 to 65 %.   2. Normal right ventricular cavity size and normal right ventricular systolic function.   3. Left atrium is mildly dilated.   4. Mild mitral regurgitation.   5. Agitated saline injection was negative for intracardiac shunt.   6. No evidence of left atrial appendage thrombus.   7. No prior echocardiogram is available for comparison.   8. The patient was successfully cardioverted to sinus rhythm.    ?  CLARISA 24: 1. Left ventricular systolic function is normal with an ejection fraction visually estimated at 60 to 65 %.   2. Normal right ventricular cavity size and normal right ventricular systolic function.   3. Left atrium is mildly dilated.   4. Mild mitral regurgitation.   5. Agitated saline injection was negative for intracardiac shunt.   6. No evidence of left atrial appendage thrombus.   7. No prior echocardiogram is available for comparison.   8. The patient was successfully cardioverted to sinus rhythm.  ?    ROS: as stated above, otherwise negative    PHYSICAL EXAM:  Constitutional: A & O x 3  HEENT:   Normal oral mucosa, PERRL, EOMI	  Cardiovascular: Normal S1 S2, No JVD, No murmurs  Respiratory: Lungs clear to auscultation	  Gastrointestinal:  Soft, Non-tender, + BS	  Skin: No rashes, No ecchymoses, No cyanosis  Neurologic: Non-focal  Extremities: Normal range of motion, no edema  Vascular: Peripheral pulses palpable + bilaterally                               Indication:     Risk Stratification:  ASA:  Mallampati:      Plan/Recommendations:   -plan for **** on ***  -patient seen and examined in PST on ***  -ECG and Labs reviewed  -NPO after midnight prior with exception of sip of water with morning medications  -Continue/Hold ***  -Pre-procedure instructions provided (verbal & written)   -Supplies and verbal/written Instructions for pre-surgical chlorhexadine shower provided*****  -Consent to be obtained by attending electrophysiologist on the scheduled procedure date                                                                                   Department of Cardiology                                                                  [facility]/[place] [address] [address]                                                            Telephone: [phone]. Fax:[phone]                                                                             Pre-EP Procedure H and P    HPI:79-year-old male presenting for hospital follow up. The patient has a history of HTN, HLD, carotid disease, diabetes, history knee and hip surgery, lumbar radiculopathy and persistent atrial fibrillation. The patient was first diagnosed with atrial fibrillation in 2024 on a routine ECG prior to planned spinal surgery. He was started on metoprolol and Eliquis. He underwent cardioversion on 24. He was noted to be back in atrial fibrillation on ambulatory monitoring in 10/2024. He is currently maintained on Eliquis 5 mg bid and metoprolol 100 mg bid. He notes occasional shortness of breath and tiredness. He denies any palpitations or dizziness. Most recent TTE reported an EF of 65-70% and a moderately dilated left atrium. He is referred for an ablation procedure.  ?  Cardiology Summary  EC24:atrial fibrillation, 89 bpm  23: NSR  23: NSR  3/28/24: NSR  24: New Onset Atrial Fibrillation with RVR      Echo: TTETEE 24: 1. Left ventricular systolic function is normal with an ejection fraction visually estimated at 60 to 65 %.   2. Normal right ventricular cavity size and normal right ventricular systolic function.   3. Left atrium is mildly dilated.   4. Mild mitral regurgitation.   5. Agitated saline injection was negative for intracardiac shunt.   6. No evidence of left atrial appendage thrombus.   7. No prior echocardiogram is available for comparison.   8. The patient was successfully cardioverted to sinus rhythm.    ?  CLARISA 24: 1. Left ventricular systolic function is normal with an ejection fraction visually estimated at 60 to 65 %.   2. Normal right ventricular cavity size and normal right ventricular systolic function.   3. Left atrium is mildly dilated.   4. Mild mitral regurgitation.   5. Agitated saline injection was negative for intracardiac shunt.   6. No evidence of left atrial appendage thrombus.   7. No prior echocardiogram is available for comparison.   8. The patient was successfully cardioverted to sinus rhythm.  ?    ROS: as stated above, otherwise negative    PHYSICAL EXAM:  Constitutional: A & O x 3  HEENT:   Normal oral mucosa, PERRL, EOMI	  Cardiovascular: Normal S1 S2, No JVD, No murmurs  Respiratory: Lungs clear to auscultation	  Gastrointestinal:  Soft, Non-tender, + BS	  Skin: No rashes, No ecchymoses, No cyanosis  Neurologic: Non-focal  Extremities: Normal range of motion, no edema  Vascular: Peripheral pulses palpable + bilaterally

## 2025-01-09 NOTE — H&P PST ADULT - ASSESSMENT
79-year-old male with a history of HTN, diabetes, and persistent atrial fibrillation. He underwent CLARISA/DCCV for AF in 9/2024 but was followed by early recurrence. He is on rate control medications but continues to have some symptoms including fatigue and occasional shortness of breath. We discussed various treatment options. I recommended an ablation procedure given higher likelihood of success at this stage. Now presents to PST prior to Afib ablation.    Plan/Recommendations:   -plan for Afib ablation on 1/17/205  -patient seen and examined in PST on 1/9/25  -ECG and Labs reviewed  -NPO after midnight prior with exception of sip of water with morning medications prior to procedure  -Hold eliquis, glimperide and metformin day of procedure. Last dose of Mounjaro 1/6/25.  -Pre-procedure instructions provided (verbal & written)  -Consent to be obtained by attending electrophysiologist on the scheduled procedure date           79-year-old male with a history of HTN, diabetes, and persistent atrial fibrillation. He underwent CLARISA/DCCV for AF in 9/2024 but was followed by early recurrence. He is on rate control medications but continues to have some symptoms including fatigue and occasional shortness of breath.  Now presents to PST prior to Afib ablation.    Plan/Recommendations:   -plan for Afib ablation on 1/17/205  -patient seen and examined in PST on 1/9/25  -ECG and Labs reviewed  -NPO after midnight prior with exception of sip of water with morning medications prior to procedure  -Hold eliquis, glimperide and metformin day of procedure. Last dose of Mounjaro 1/6/25.  -Pre-procedure instructions provided (verbal & written)  -Magnesium oxide 500mg po BID x 2 doses sent to pharmacy  -Consent to be obtained by attending electrophysiologist on the scheduled procedure date

## 2025-01-17 ENCOUNTER — OUTPATIENT (OUTPATIENT)
Dept: OUTPATIENT SERVICES | Facility: HOSPITAL | Age: 80
LOS: 1 days | End: 2025-01-17
Payer: MEDICARE

## 2025-01-17 ENCOUNTER — TRANSCRIPTION ENCOUNTER (OUTPATIENT)
Age: 80
End: 2025-01-17

## 2025-01-17 DIAGNOSIS — Z98.890 OTHER SPECIFIED POSTPROCEDURAL STATES: Chronic | ICD-10-CM

## 2025-01-17 DIAGNOSIS — Z96.641 PRESENCE OF RIGHT ARTIFICIAL HIP JOINT: Chronic | ICD-10-CM

## 2025-01-17 DIAGNOSIS — Z98.49 CATARACT EXTRACTION STATUS, UNSPECIFIED EYE: Chronic | ICD-10-CM

## 2025-01-17 RX ORDER — TIRZEPATIDE 7.5 MG/.5ML
5 INJECTION, SOLUTION SUBCUTANEOUS
Refills: 0 | DISCHARGE

## 2025-01-17 RX ORDER — SPIRONOLACTONE 50 MG/1
1 TABLET ORAL
Refills: 0 | DISCHARGE

## 2025-01-18 ENCOUNTER — TRANSCRIPTION ENCOUNTER (OUTPATIENT)
Age: 80
End: 2025-01-18

## 2025-01-18 PROCEDURE — 36415 COLL VENOUS BLD VENIPUNCTURE: CPT

## 2025-01-18 PROCEDURE — 92960 CARDIOVERSION ELECTRIC EXT: CPT | Mod: 59

## 2025-01-18 PROCEDURE — C1732: CPT

## 2025-01-18 PROCEDURE — C1733: CPT

## 2025-01-18 PROCEDURE — 85027 COMPLETE CBC AUTOMATED: CPT

## 2025-01-18 PROCEDURE — C1730: CPT

## 2025-01-18 PROCEDURE — C1769: CPT

## 2025-01-18 PROCEDURE — 93005 ELECTROCARDIOGRAM TRACING: CPT

## 2025-01-18 PROCEDURE — C1759: CPT

## 2025-01-18 PROCEDURE — C1766: CPT

## 2025-01-18 PROCEDURE — 82962 GLUCOSE BLOOD TEST: CPT

## 2025-01-18 PROCEDURE — C1894: CPT

## 2025-01-18 PROCEDURE — 93656 COMPRE EP EVAL ABLTJ ATR FIB: CPT

## 2025-01-18 PROCEDURE — 83735 ASSAY OF MAGNESIUM: CPT

## 2025-01-18 PROCEDURE — C9399: CPT

## 2025-01-18 PROCEDURE — 80048 BASIC METABOLIC PNL TOTAL CA: CPT

## 2025-01-18 PROCEDURE — 93657 TX L/R ATRIAL FIB ADDL: CPT

## 2025-01-22 DIAGNOSIS — I48.91 UNSPECIFIED ATRIAL FIBRILLATION: ICD-10-CM

## 2025-01-30 ENCOUNTER — NON-APPOINTMENT (OUTPATIENT)
Age: 80
End: 2025-01-30

## 2025-01-30 ENCOUNTER — APPOINTMENT (OUTPATIENT)
Dept: CARDIOLOGY | Facility: CLINIC | Age: 80
End: 2025-01-30
Payer: MEDICARE

## 2025-01-30 VITALS
DIASTOLIC BLOOD PRESSURE: 71 MMHG | WEIGHT: 197 LBS | SYSTOLIC BLOOD PRESSURE: 164 MMHG | OXYGEN SATURATION: 97 % | HEIGHT: 67 IN | BODY MASS INDEX: 30.92 KG/M2 | RESPIRATION RATE: 16 BRPM | HEART RATE: 78 BPM

## 2025-01-30 DIAGNOSIS — I50.30 UNSPECIFIED DIASTOLIC (CONGESTIVE) HEART FAILURE: ICD-10-CM

## 2025-01-30 DIAGNOSIS — I65.29 OCCLUSION AND STENOSIS OF UNSPECIFIED CAROTID ARTERY: ICD-10-CM

## 2025-01-30 DIAGNOSIS — I10 ESSENTIAL (PRIMARY) HYPERTENSION: ICD-10-CM

## 2025-01-30 DIAGNOSIS — Z98.890 OTHER SPECIFIED POSTPROCEDURAL STATES: ICD-10-CM

## 2025-01-30 DIAGNOSIS — I48.91 UNSPECIFIED ATRIAL FIBRILLATION: ICD-10-CM

## 2025-01-30 DIAGNOSIS — Z86.79 OTHER SPECIFIED POSTPROCEDURAL STATES: ICD-10-CM

## 2025-01-30 DIAGNOSIS — R53.82 CHRONIC FATIGUE, UNSPECIFIED: ICD-10-CM

## 2025-01-30 PROCEDURE — G2211 COMPLEX E/M VISIT ADD ON: CPT

## 2025-01-30 PROCEDURE — 93000 ELECTROCARDIOGRAM COMPLETE: CPT

## 2025-01-30 PROCEDURE — 99214 OFFICE O/P EST MOD 30 MIN: CPT

## 2025-02-03 ENCOUNTER — RX RENEWAL (OUTPATIENT)
Age: 80
End: 2025-02-03

## 2025-02-18 ENCOUNTER — NON-APPOINTMENT (OUTPATIENT)
Age: 80
End: 2025-02-18

## 2025-02-18 ENCOUNTER — APPOINTMENT (OUTPATIENT)
Dept: ELECTROPHYSIOLOGY | Facility: CLINIC | Age: 80
End: 2025-02-18
Payer: MEDICARE

## 2025-02-18 VITALS
OXYGEN SATURATION: 99 % | HEART RATE: 77 BPM | DIASTOLIC BLOOD PRESSURE: 67 MMHG | HEIGHT: 67 IN | BODY MASS INDEX: 30.92 KG/M2 | WEIGHT: 197 LBS | SYSTOLIC BLOOD PRESSURE: 170 MMHG

## 2025-02-18 VITALS — DIASTOLIC BLOOD PRESSURE: 78 MMHG | SYSTOLIC BLOOD PRESSURE: 140 MMHG

## 2025-02-18 DIAGNOSIS — Z86.79 OTHER SPECIFIED POSTPROCEDURAL STATES: ICD-10-CM

## 2025-02-18 DIAGNOSIS — I48.91 UNSPECIFIED ATRIAL FIBRILLATION: ICD-10-CM

## 2025-02-18 DIAGNOSIS — Z98.890 OTHER SPECIFIED POSTPROCEDURAL STATES: ICD-10-CM

## 2025-02-18 PROCEDURE — 99214 OFFICE O/P EST MOD 30 MIN: CPT | Mod: 25

## 2025-02-18 PROCEDURE — 93000 ELECTROCARDIOGRAM COMPLETE: CPT | Mod: 59

## 2025-02-18 RX ORDER — AMIODARONE HYDROCHLORIDE 200 MG/1
200 TABLET ORAL DAILY
Refills: 0 | Status: ACTIVE | COMMUNITY

## 2025-02-25 ENCOUNTER — OFFICE (OUTPATIENT)
Dept: URBAN - METROPOLITAN AREA CLINIC 94 | Facility: CLINIC | Age: 80
Setting detail: OPHTHALMOLOGY
End: 2025-02-25
Payer: COMMERCIAL

## 2025-02-25 DIAGNOSIS — H35.341: ICD-10-CM

## 2025-02-25 DIAGNOSIS — H43.11: ICD-10-CM

## 2025-02-25 DIAGNOSIS — E11.3312: ICD-10-CM

## 2025-02-25 DIAGNOSIS — H35.3112: ICD-10-CM

## 2025-02-25 DIAGNOSIS — E11.3513: ICD-10-CM

## 2025-02-25 DIAGNOSIS — H43.391: ICD-10-CM

## 2025-02-25 PROCEDURE — 92134 CPTRZ OPH DX IMG PST SGM RTA: CPT | Performed by: OPHTHALMOLOGY

## 2025-02-25 PROCEDURE — DEFER/DELA DEFER/DELAY 30 DAY: Performed by: OPHTHALMOLOGY

## 2025-02-25 PROCEDURE — 67028 INJECTION EYE DRUG: CPT | Mod: LT | Performed by: OPHTHALMOLOGY

## 2025-02-25 ASSESSMENT — VISUAL ACUITY
OS_BCVA: 20/50
OD_BCVA: 20/25

## 2025-02-25 ASSESSMENT — KERATOMETRY
OS_K1POWER_DIOPTERS: 42.08
OS_AXISANGLE_DEGREES: 017
OD_AXISANGLE_DEGREES: 128
OS_K2POWER_DIOPTERS: 44.00
OD_K2POWER_DIOPTERS: 43.38
OD_K1POWER_DIOPTERS: 43.16

## 2025-02-25 ASSESSMENT — REFRACTION_AUTOREFRACTION
OD_SPHERE: +0.50
OS_AXIS: 102
OD_CYLINDER: -1.00
OS_SPHERE: +1.25
OS_CYLINDER: -1.75
OD_AXIS: 083

## 2025-02-25 ASSESSMENT — TONOMETRY
OD_IOP_MMHG: 19
OS_IOP_MMHG: 14

## 2025-02-25 ASSESSMENT — CONFRONTATIONAL VISUAL FIELD TEST (CVF)
OD_FINDINGS: FULL
OS_FINDINGS: FULL

## 2025-03-04 ENCOUNTER — NON-APPOINTMENT (OUTPATIENT)
Age: 80
End: 2025-03-04

## 2025-03-16 NOTE — CONSULT NOTE ADULT - SUBJECTIVE AND OBJECTIVE BOX
CARDIAC ELECTROPHYSIOLOGY  [facility]   Office: [address] [place] [address]  Telephone: [phone] Fax:[phone]      HPI:  80 y/o male with hx of HTN, HLD, HTN, recent diagnosis of PAF (confirmed on outpatient monitor) who was sent in from PMD for Atrial flutter with RVR in 150s.  Converted to sinus rhythm overnight    Patient was there for pre operative eval for eventual back surgery. Daughter at bedside for collateral information. Patient has had his rate controlling medications adjusted but he still has episodes of RVR. Denies CP, SOB, N/V, or diaphoresis. He ambulates mostly independently but has been using a cane more frequently due to low back pain from DJD. No falls or bleeding reported. He admits to drinking upwards of 5 shots of espresso daily but has since cut down to 1-2 to help with his heart rate. Denies any other recent illnesses or medication changes and he and his Daughter state his is compliant with his medications. In the ED noted to be in Atrial flutter with variable block. No evidence of CHF/ACS, labs otherwise unremarkable. He was given his evening dose of Toprol and IV Metoprolol with good effect and HR now  . TSH normal. ED discussed case with Dr. Danielle who advised admission for possible CLARISA/DCCV in AM.  (22 Aug 2024 23:38)    INCOMPLETE NOTE**    CARDIOLOGY SUMMARY:  Echo: TTE 10/9/23: 1. Normal biventricular systolic function. 2. There is moderate (grade 2) left ventricular diastolic dysfunction, with elevated filling pressure. 3. The left atrium is moderately dilated in size.4. The right atrium is dilated in size. 5. Mild mitral regurgitation.    PAST MEDICAL & SURGICAL HISTORY:  Atrial flutter      HTN (hypertension)      HLD (hyperlipidemia)      Diabetes      Back pain      No significant past surgical history          REVIEW OF SYSTEMS:    CONSTITUTIONAL: No fever, weight loss, or fatigue  EYES: No visual disturbances  NECK: No pain or stiffness  RESPIRATORY: No cough, wheezing, chills or hemoptysis; No shortness of breath  CARDIOVASCULAR: see HPI  GASTROINTESTINAL: No abdominal or epigastric pain. No nausea, vomiting, or hematemesis; No diarrhea or constipation. No melena or hematochezia.  NEUROLOGICAL: No headaches, memory loss, loss of strength, numbness, or tremors  SKIN: No itching, burning, rashes, or lesions   LYMPH NODES: No enlarged glands  ENDOCRINE: No heat or cold intolerance; No hair loss  PSYCHIATRIC: No depression, anxiety, mood swings, or difficulty sleeping  HEME/LYMPH: No easy bruising, or bleeding gums      MEDICATIONS  (STANDING):  amLODIPine   Tablet 10 milliGRAM(s) Oral daily  apixaban 5 milliGRAM(s) Oral every 12 hours  dextrose 5%. 1000 milliLiter(s) (50 mL/Hr) IV Continuous <Continuous>  dextrose 5%. 1000 milliLiter(s) (100 mL/Hr) IV Continuous <Continuous>  dextrose 50% Injectable 25 Gram(s) IV Push once  dextrose 50% Injectable 12.5 Gram(s) IV Push once  dextrose 50% Injectable 25 Gram(s) IV Push once  glucagon  Injectable 1 milliGRAM(s) IntraMuscular once  insulin lispro (ADMELOG) corrective regimen sliding scale   SubCutaneous every 6 hours  losartan 100 milliGRAM(s) Oral daily  metoprolol succinate  milliGRAM(s) Oral daily  metoprolol succinate  milliGRAM(s) Oral at bedtime  pantoprazole    Tablet 40 milliGRAM(s) Oral before breakfast  rosuvastatin 5 milliGRAM(s) Oral at bedtime  sodium chloride 0.9% lock flush 3 milliLiter(s) IV Push every 8 hours    MEDICATIONS  (PRN):  acetaminophen     Tablet .. 650 milliGRAM(s) Oral every 6 hours PRN Temp greater or equal to 38C (100.4F), Mild Pain (1 - 3)  aluminum hydroxide/magnesium hydroxide/simethicone Suspension 30 milliLiter(s) Oral every 4 hours PRN Dyspepsia  dextrose Oral Gel 15 Gram(s) Oral once PRN Blood Glucose LESS THAN 70 milliGRAM(s)/deciliter  melatonin 3 milliGRAM(s) Oral at bedtime PRN Insomnia  ondansetron Injectable 4 milliGRAM(s) IV Push every 8 hours PRN Nausea and/or Vomiting      Allergies    No Known Allergies    Intolerances        SOCIAL HISTORY:    FAMILY HISTORY:  No pertinent family history in first degree relatives        Vital Signs Last 24 Hrs  T(C): 36.5 (23 Aug 2024 07:39), Max: 36.9 (22 Aug 2024 18:05)  T(F): 97.7 (23 Aug 2024 07:39), Max: 98.5 (22 Aug 2024 18:05)  HR: 68 (23 Aug 2024 07:39) (68 - 138)  BP: 152/79 (23 Aug 2024 07:39) (143/82 - 175/82)  BP(mean): 113 (23 Aug 2024 02:20) (104 - 113)  RR: 17 (23 Aug 2024 07:39) (16 - 20)  SpO2: 98% (23 Aug 2024 07:39) (95% - 98%)    Parameters below as of 23 Aug 2024 07:39  Patient On (Oxygen Delivery Method): room air        Physical Exam:  Constitutional: AAOx3, NAD  Neck: supple, No JVD  Cardiovascular: +S1S2 RRR, no murmurs, rubs, gallops   Pulmonary: CTA b/l, unlabored, no wheezes, rales. rhonci  Abdomen: +BS, soft NTND  Extremities: no edema b/l, +distal pulses b/l  Neuro: non focal, speech clear, ODOM x 4    LABS:                        13.6   6.01  )-----------( 257      ( 22 Aug 2024 20:00 )             40.5   08-22    142  |  107  |  27.7<H>  ----------------------------<  127<H>  3.6   |  22.0  |  0.73    Ca    9.5      22 Aug 2024 20:00  Mg     1.8     08-22    TPro  6.9  /  Alb  3.8  /  TBili  0.3<L>  /  DBili  x   /  AST  15  /  ALT  18  /  AlkPhos  50  08-22  LIVER FUNCTIONS - ( 22 Aug 2024 20:00 )  Alb: 3.8 g/dL / Pro: 6.9 g/dL / ALK PHOS: 50 U/L / ALT: 18 U/L / AST: 15 U/L / GGT: x               Urinalysis Basic - ( 22 Aug 2024 20:00 )    Color: x / Appearance: x / SG: x / pH: x  Gluc: 127 mg/dL / Ketone: x  / Bili: x / Urobili: x   Blood: x / Protein: x / Nitrite: x   Leuk Esterase: x / RBC: x / WBC x   Sq Epi: x / Non Sq Epi: x / Bacteria: x        RADIOLOGY & ADDITIONAL STUDIES:   CARDIAC ELECTROPHYSIOLOGY  [facility]   Office: [address] [place] [address]  Telephone: [phone] Fax:[phone]      HPI:  78 y/o male with hx of HTN, HLD, HTN, recent diagnosis of PAF (confirmed on outpatient monitor) who was sent in from PMD for Atrial flutter with RVR in 150s for which EP was consulted.  Received PO and IV Metoprolol in ED and converted to sinus rhythm overnight.  NO significant conversion pauses or bradyarrhythmia.    Was recently diagnosed with new onset PAF at outpatient follow-up on 7/26/24. EKG showed AF @ 114bpm, narrow QRS. Home Toprol was increased to 100mg BID and started on Eliquis 5mg BID.  Outpatient HM confirmed PAF with intermittent RVR and periods of sinus rhythm (AF burden and Avg HR in AF are unclear). Subsequently found to be in AFlutter by PCP and sent to ED. Reports a mild "anxious feeling" during AF events but primarily asymptomatic. Denies palpitations, dizziness, LH, syncope or presyncope.  NO CP, SOB, CROWLEY, LE swelling or orthopnea.   Planned for outpatient back surgery.  Awaiting TTE and pharm NST for preoperative clearance.       CARDIOLOGY SUMMARY:    Echo: TTE 10/9/23: 1. Normal biventricular systolic function. 2. There is moderate (grade 2) left ventricular diastolic dysfunction, with elevated filling pressure. 3. The left atrium is moderately dilated in size.4. The right atrium is dilated in size. 5. Mild mitral regurgitation.    PAST MEDICAL & SURGICAL HISTORY:  Atrial flutter  HTN (hypertension)  HLD (hyperlipidemia)  Diabetes  Back pain  No significant past surgical history      REVIEW OF SYSTEMS:    CONSTITUTIONAL: No fever, weight loss, or fatigue  EYES: No visual disturbances  NECK: No pain or stiffness  RESPIRATORY: No cough, wheezing, chills or hemoptysis; No shortness of breath  CARDIOVASCULAR: see HPI  GASTROINTESTINAL: No abdominal or epigastric pain. No nausea, vomiting, or hematemesis; No diarrhea or constipation. No melena or hematochezia.  NEUROLOGICAL: No headaches, memory loss, loss of strength, numbness, or tremors  SKIN: No itching, burning, rashes, or lesions   LYMPH NODES: No enlarged glands  ENDOCRINE: No heat or cold intolerance; No hair loss  PSYCHIATRIC: No depression, anxiety, mood swings, or difficulty sleeping  HEME/LYMPH: No easy bruising, or bleeding gums      MEDICATIONS  (STANDING):  amLODIPine   Tablet 10 milliGRAM(s) Oral daily  apixaban 5 milliGRAM(s) Oral every 12 hours  dextrose 5%. 1000 milliLiter(s) (50 mL/Hr) IV Continuous <Continuous>  dextrose 5%. 1000 milliLiter(s) (100 mL/Hr) IV Continuous <Continuous>  dextrose 50% Injectable 25 Gram(s) IV Push once  dextrose 50% Injectable 12.5 Gram(s) IV Push once  dextrose 50% Injectable 25 Gram(s) IV Push once  glucagon  Injectable 1 milliGRAM(s) IntraMuscular once  insulin lispro (ADMELOG) corrective regimen sliding scale   SubCutaneous every 6 hours  losartan 100 milliGRAM(s) Oral daily  metoprolol succinate  milliGRAM(s) Oral daily  metoprolol succinate  milliGRAM(s) Oral at bedtime  pantoprazole    Tablet 40 milliGRAM(s) Oral before breakfast  rosuvastatin 5 milliGRAM(s) Oral at bedtime  sodium chloride 0.9% lock flush 3 milliLiter(s) IV Push every 8 hours    MEDICATIONS  (PRN):  acetaminophen     Tablet .. 650 milliGRAM(s) Oral every 6 hours PRN Temp greater or equal to 38C (100.4F), Mild Pain (1 - 3)  aluminum hydroxide/magnesium hydroxide/simethicone Suspension 30 milliLiter(s) Oral every 4 hours PRN Dyspepsia  dextrose Oral Gel 15 Gram(s) Oral once PRN Blood Glucose LESS THAN 70 milliGRAM(s)/deciliter  melatonin 3 milliGRAM(s) Oral at bedtime PRN Insomnia  ondansetron Injectable 4 milliGRAM(s) IV Push every 8 hours PRN Nausea and/or Vomiting      Allergies    No Known Allergies    Intolerances        SOCIAL HISTORY:    FAMILY HISTORY:  No pertinent family history in first degree relatives        Vital Signs Last 24 Hrs  T(C): 36.5 (23 Aug 2024 07:39), Max: 36.9 (22 Aug 2024 18:05)  T(F): 97.7 (23 Aug 2024 07:39), Max: 98.5 (22 Aug 2024 18:05)  HR: 68 (23 Aug 2024 07:39) (68 - 138)  BP: 152/79 (23 Aug 2024 07:39) (143/82 - 175/82)  BP(mean): 113 (23 Aug 2024 02:20) (104 - 113)  RR: 17 (23 Aug 2024 07:39) (16 - 20)  SpO2: 98% (23 Aug 2024 07:39) (95% - 98%)    Parameters below as of 23 Aug 2024 07:39  Patient On (Oxygen Delivery Method): room air        Physical Exam:  Constitutional: AAOx3, NAD  Neck: supple, No JVD  Cardiovascular: +S1S2 RRR, no murmurs, rubs, gallops   Pulmonary: CTA b/l, unlabored, no wheezes, rales. rhonci  Abdomen: +BS, soft NTND  Extremities: no edema b/l, +distal pulses b/l  Neuro: non focal, speech clear, ODOM x 4    LABS:                        13.6   6.01  )-----------( 257      ( 22 Aug 2024 20:00 )             40.5   08-22    142  |  107  |  27.7<H>  ----------------------------<  127<H>  3.6   |  22.0  |  0.73    Ca    9.5      22 Aug 2024 20:00  Mg     1.8     08-22    TPro  6.9  /  Alb  3.8  /  TBili  0.3<L>  /  DBili  x   /  AST  15  /  ALT  18  /  AlkPhos  50  08-22  LIVER FUNCTIONS - ( 22 Aug 2024 20:00 )  Alb: 3.8 g/dL / Pro: 6.9 g/dL / ALK PHOS: 50 U/L / ALT: 18 U/L / AST: 15 U/L / GGT: x               Urinalysis Basic - ( 22 Aug 2024 20:00 )    Color: x / Appearance: x / SG: x / pH: x  Gluc: 127 mg/dL / Ketone: x  / Bili: x / Urobili: x   Blood: x / Protein: x / Nitrite: x   Leuk Esterase: x / RBC: x / WBC x   Sq Epi: x / Non Sq Epi: x / Bacteria: x        ASSESSMENT/PLAN:    78 y/o male with hx of HTN, HLD, HTN, recent diagnosis of PAF (confirmed on outpatient monitor) who was sent in from PMD for Atrial flutter with RVR in 150s for which EP was consulted.  Received PO and IV Metoprolol in ED and converted to sinus rhythm overnight.  NO significant conversion pauses or bradyarrhythmia.    Was recently diagnosed with new onset PAF at outpatient follow-up on 7/26/24. EKG showed AF @ 114bpm, narrow QRS. Home Toprol was increased to 100mg BID and started on Eliquis 5mg BID.  Outpatient HM confirmed PAF with intermittent RVR and periods of sinus rhythm (AF burden and Avg HR in AF are unclear). Subsequently found to be in AFlutter by PCP and sent to ED. Reports a mild "anxious feeling" during AF events but primarily asymptomatic. Denies palpitations, dizziness, LH, syncope or presyncope.  NO CP, SOB, CROWLEY, LE swelling or orthopnea. Planned for outpatient back surgery.  Awaiting TTE and pharm NST for preoperative clearance.     - Continue Toprol 100mg BID and Eliquis.   - Outpatient [facility] to assess overall AF burden and HR control  - Considered trial of Multaq, however, carries increased cost ($800) and will require prior authorization before considering.  Avoid Class 1C agents until cardiac workup complete and would avoid Amiodarone given long term risks and patient is primarily asymptomatic  - To d/w Dr. Palmer. Full recommendations to follow.      CARDIAC ELECTROPHYSIOLOGY  [facility]   Office: [address] [place] [address]  Telephone: [phone] Fax:[phone]      HPI:  78 y/o male with hx of HTN, HLD, HTN, recent diagnosis of PAF (confirmed on outpatient monitor) who was sent in from PMD for Atrial flutter with RVR in 150s for which EP was consulted.  Received PO and IV Metoprolol in ED and converted to sinus rhythm overnight.  NO significant conversion pauses or bradyarrhythmia.    Was recently diagnosed with new onset PAF at outpatient follow-up on 7/26/24. EKG showed AF @ 114bpm, narrow QRS. Home Toprol was increased to 100mg BID and started on Eliquis 5mg BID.  Outpatient HM confirmed PAF with intermittent RVR and periods of sinus rhythm (AF burden and Avg HR in AF are unclear). Subsequently found to be in AFlutter by PCP and sent to ED. Reports a mild "anxious feeling" during AF events but primarily asymptomatic. Denies palpitations, dizziness, LH, syncope or presyncope.  NO CP, SOB, CROWLEY, LE swelling or orthopnea.   Planned for outpatient back surgery.  Awaiting TTE and pharm NST for preoperative clearance.       CARDIOLOGY SUMMARY:  EKG 8/22/24:   AFlutter with RVR, @ 101 bpm  Echo: TTE 10/9/23: 1. Normal biventricular systolic function. 2. There is moderate (grade 2) left ventricular diastolic dysfunction, with elevated filling pressure. 3. The left atrium is moderately dilated in size.4. The right atrium is dilated in size. 5. Mild mitral regurgitation.    PAST MEDICAL & SURGICAL HISTORY:  Atrial flutter  HTN (hypertension)  HLD (hyperlipidemia)  Diabetes  Back pain  No significant past surgical history      REVIEW OF SYSTEMS:    CONSTITUTIONAL: No fever, weight loss, or fatigue  EYES: No visual disturbances  NECK: No pain or stiffness  RESPIRATORY: No cough, wheezing, chills or hemoptysis; No shortness of breath  CARDIOVASCULAR: see HPI  GASTROINTESTINAL: No abdominal or epigastric pain. No nausea, vomiting, or hematemesis; No diarrhea or constipation. No melena or hematochezia.  NEUROLOGICAL: No headaches, memory loss, loss of strength, numbness, or tremors  SKIN: No itching, burning, rashes, or lesions   LYMPH NODES: No enlarged glands  ENDOCRINE: No heat or cold intolerance; No hair loss  PSYCHIATRIC: No depression, anxiety, mood swings, or difficulty sleeping  HEME/LYMPH: No easy bruising, or bleeding gums      MEDICATIONS  (STANDING):  amLODIPine   Tablet 10 milliGRAM(s) Oral daily  apixaban 5 milliGRAM(s) Oral every 12 hours  dextrose 5%. 1000 milliLiter(s) (50 mL/Hr) IV Continuous <Continuous>  dextrose 5%. 1000 milliLiter(s) (100 mL/Hr) IV Continuous <Continuous>  dextrose 50% Injectable 25 Gram(s) IV Push once  dextrose 50% Injectable 12.5 Gram(s) IV Push once  dextrose 50% Injectable 25 Gram(s) IV Push once  glucagon  Injectable 1 milliGRAM(s) IntraMuscular once  insulin lispro (ADMELOG) corrective regimen sliding scale   SubCutaneous every 6 hours  losartan 100 milliGRAM(s) Oral daily  metoprolol succinate  milliGRAM(s) Oral daily  metoprolol succinate  milliGRAM(s) Oral at bedtime  pantoprazole    Tablet 40 milliGRAM(s) Oral before breakfast  rosuvastatin 5 milliGRAM(s) Oral at bedtime  sodium chloride 0.9% lock flush 3 milliLiter(s) IV Push every 8 hours    MEDICATIONS  (PRN):  acetaminophen     Tablet .. 650 milliGRAM(s) Oral every 6 hours PRN Temp greater or equal to 38C (100.4F), Mild Pain (1 - 3)  aluminum hydroxide/magnesium hydroxide/simethicone Suspension 30 milliLiter(s) Oral every 4 hours PRN Dyspepsia  dextrose Oral Gel 15 Gram(s) Oral once PRN Blood Glucose LESS THAN 70 milliGRAM(s)/deciliter  melatonin 3 milliGRAM(s) Oral at bedtime PRN Insomnia  ondansetron Injectable 4 milliGRAM(s) IV Push every 8 hours PRN Nausea and/or Vomiting      Allergies    No Known Allergies    Intolerances        SOCIAL HISTORY: denies alcohol or illicit drug use    FAMILY HISTORY:  No pertinent family history in first degree relatives        Vital Signs Last 24 Hrs  T(C): 36.5 (23 Aug 2024 07:39), Max: 36.9 (22 Aug 2024 18:05)  T(F): 97.7 (23 Aug 2024 07:39), Max: 98.5 (22 Aug 2024 18:05)  HR: 68 (23 Aug 2024 07:39) (68 - 138)  BP: 152/79 (23 Aug 2024 07:39) (143/82 - 175/82)  BP(mean): 113 (23 Aug 2024 02:20) (104 - 113)  RR: 17 (23 Aug 2024 07:39) (16 - 20)  SpO2: 98% (23 Aug 2024 07:39) (95% - 98%)    Parameters below as of 23 Aug 2024 07:39  Patient On (Oxygen Delivery Method): room air        Physical Exam:  Constitutional: AAOx3, NAD  Neck: supple, No JVD  Cardiovascular: +S1S2 RRR, no murmurs, rubs, gallops   Pulmonary: CTA b/l, unlabored, no wheezes, rales. rhonci  Abdomen: +BS, soft NTND  Extremities: no edema b/l, +distal pulses b/l  Neuro: non focal, speech clear, ODOM x 4    LABS:                        13.6   6.01  )-----------( 257      ( 22 Aug 2024 20:00 )             40.5   08-22    142  |  107  |  27.7<H>  ----------------------------<  127<H>  3.6   |  22.0  |  0.73    Ca    9.5      22 Aug 2024 20:00  Mg     1.8     08-22    TPro  6.9  /  Alb  3.8  /  TBili  0.3<L>  /  DBili  x   /  AST  15  /  ALT  18  /  AlkPhos  50  08-22  LIVER FUNCTIONS - ( 22 Aug 2024 20:00 )  Alb: 3.8 g/dL / Pro: 6.9 g/dL / ALK PHOS: 50 U/L / ALT: 18 U/L / AST: 15 U/L / GGT: x               Urinalysis Basic - ( 22 Aug 2024 20:00 )    Color: x / Appearance: x / SG: x / pH: x  Gluc: 127 mg/dL / Ketone: x  / Bili: x / Urobili: x   Blood: x / Protein: x / Nitrite: x   Leuk Esterase: x / RBC: x / WBC x   Sq Epi: x / Non Sq Epi: x / Bacteria: x        ASSESSMENT/PLAN:    78 y/o male with hx of HTN, HLD, HTN, recent diagnosis of PAF (confirmed on outpatient monitor) who was sent in from PMD for Atrial flutter with RVR in 150s for which EP was consulted.  Received home dose of BB and IV Metoprolol x 1 in ED and converted to sinus rhythm overnight.  NO significant conversion pauses or bradyarrhythmia.      Was recently diagnosed with new onset PAF at outpatient follow-up on 7/26/24. EKG showed AF @ 114bpm, narrow QRS. Home Toprol was increased to 100mg BID and started on Eliquis 5mg BID.  Outpatient HM confirmed PAF with intermittent RVR and periods of sinus rhythm (AF burden and Avg HR in AF are unclear). Subsequently found to be in AFlutter w/ RVR by PCP and sent to ED. Reports a mild "anxious feeling" during AF events but primarily asymptomatic. Avg HR in aflutter during admission were 80-110bpm.  Planned for outpatient back surgery.  Awaiting TTE and pharm NST for preoperative clearance. Currently tolerating Eliquis without bleeding events.    - Continue current medications including Toprol 100mg BID and Eliquis 5mg BID   - Outpatient 1 week [facility]OT to assess overall AF burden and HR control.  Will send from office  - Considered trial of Multaq, however, carries increased cost ($800) and will require prior authorization before considering.  Avoid Class 1C agents until cardiac workup complete and would avoid Amiodarone given long term risks and patient is primarily asymptomatic  - Recommend outpatient EP follow-up with Dr. Palmer to discuss AF management.    Seen and d/w Dr. Palmer 85 YO male presented to the ED with c/o confusion and inability to find words to speak properly associated with dizziness since 220 pm this afternoon. As per family patient took a nap and woke up with confusion. PMH HTN, HLD, GBS  and TA. Patient uses a cane at bedside, code stroke activated, MD Meir LIM at bedside. NIHSS-3, Blood work sent, CT scan ongoing.

## 2025-03-25 ENCOUNTER — OFFICE (OUTPATIENT)
Dept: URBAN - METROPOLITAN AREA CLINIC 94 | Facility: CLINIC | Age: 80
Setting detail: OPHTHALMOLOGY
End: 2025-03-25
Payer: COMMERCIAL

## 2025-03-25 DIAGNOSIS — E11.3513: ICD-10-CM

## 2025-03-25 DIAGNOSIS — E11.3312: ICD-10-CM

## 2025-03-25 PROCEDURE — 67210 TREATMENT OF RETINAL LESION: CPT | Mod: 79,LT | Performed by: OPHTHALMOLOGY

## 2025-03-25 PROCEDURE — DEFER/DELA DEFER/DELAY 30 DAY: Performed by: OPHTHALMOLOGY

## 2025-03-25 ASSESSMENT — KERATOMETRY
OS_AXISANGLE_DEGREES: 017
OS_K1POWER_DIOPTERS: 42.08
OD_K1POWER_DIOPTERS: 43.16
OD_AXISANGLE_DEGREES: 128
OD_K2POWER_DIOPTERS: 43.38
OS_K2POWER_DIOPTERS: 44.00

## 2025-03-25 ASSESSMENT — CONFRONTATIONAL VISUAL FIELD TEST (CVF)
OS_FINDINGS: FULL
OD_FINDINGS: FULL

## 2025-03-25 ASSESSMENT — REFRACTION_AUTOREFRACTION
OD_CYLINDER: -1.00
OS_CYLINDER: -1.75
OS_AXIS: 102
OD_AXIS: 083
OD_SPHERE: +0.50
OS_SPHERE: +1.25

## 2025-03-25 ASSESSMENT — VISUAL ACUITY
OS_BCVA: 20/50
OD_BCVA: 20/20-1

## 2025-03-25 ASSESSMENT — TONOMETRY
OD_IOP_MMHG: 15
OS_IOP_MMHG: 13

## 2025-04-29 ENCOUNTER — OFFICE (OUTPATIENT)
Dept: URBAN - METROPOLITAN AREA CLINIC 94 | Facility: CLINIC | Age: 80
Setting detail: OPHTHALMOLOGY
End: 2025-04-29
Payer: COMMERCIAL

## 2025-04-29 DIAGNOSIS — E11.3513: ICD-10-CM

## 2025-04-29 DIAGNOSIS — E11.3512: ICD-10-CM

## 2025-04-29 PROCEDURE — 67028 INJECTION EYE DRUG: CPT | Mod: 58,LT | Performed by: OPHTHALMOLOGY

## 2025-04-29 PROCEDURE — 92134 CPTRZ OPH DX IMG PST SGM RTA: CPT | Performed by: OPHTHALMOLOGY

## 2025-04-29 ASSESSMENT — KERATOMETRY
OS_AXISANGLE_DEGREES: 017
OS_K2POWER_DIOPTERS: 44.00
OD_AXISANGLE_DEGREES: 128
OS_K1POWER_DIOPTERS: 42.08
OD_K1POWER_DIOPTERS: 43.16
OD_K2POWER_DIOPTERS: 43.38

## 2025-04-29 ASSESSMENT — REFRACTION_AUTOREFRACTION
OD_SPHERE: +0.50
OS_CYLINDER: -1.75
OD_CYLINDER: -1.00
OS_AXIS: 102
OS_SPHERE: +1.25
OD_AXIS: 083

## 2025-04-29 ASSESSMENT — TONOMETRY
OD_IOP_MMHG: 16
OS_IOP_MMHG: 12

## 2025-04-29 ASSESSMENT — CONFRONTATIONAL VISUAL FIELD TEST (CVF)
OS_FINDINGS: FULL
OD_FINDINGS: FULL

## 2025-04-29 ASSESSMENT — VISUAL ACUITY
OD_BCVA: 20/20-1
OS_BCVA: 20/50

## 2025-07-01 NOTE — PATIENT PROFILE ADULT - CENTRAL VENOUS CATHETER/PICC LINE
Render Risk Assessment In Note?: no Additional Notes: Will seek PDT approval on scalp x 2. One month apart. Detail Level: Simple no

## 2025-07-22 ENCOUNTER — OFFICE (OUTPATIENT)
Dept: URBAN - METROPOLITAN AREA CLINIC 94 | Facility: CLINIC | Age: 80
Setting detail: OPHTHALMOLOGY
End: 2025-07-22
Payer: COMMERCIAL

## 2025-07-22 DIAGNOSIS — E11.3312: ICD-10-CM

## 2025-07-22 PROCEDURE — 67210 TREATMENT OF RETINAL LESION: CPT | Mod: LT | Performed by: OPHTHALMOLOGY

## 2025-07-22 ASSESSMENT — REFRACTION_AUTOREFRACTION
OS_SPHERE: +1.25
OD_SPHERE: +0.50
OS_CYLINDER: -1.75
OD_AXIS: 083
OD_CYLINDER: -1.00
OS_AXIS: 102

## 2025-07-22 ASSESSMENT — VISUAL ACUITY
OS_BCVA: 20/50
OD_BCVA: 20/20-1

## 2025-07-22 ASSESSMENT — CONFRONTATIONAL VISUAL FIELD TEST (CVF)
OD_FINDINGS: FULL
OS_FINDINGS: FULL

## 2025-07-22 ASSESSMENT — KERATOMETRY
OS_K1POWER_DIOPTERS: 42.08
OD_K1POWER_DIOPTERS: 43.16
OS_K2POWER_DIOPTERS: 44.00
OD_AXISANGLE_DEGREES: 128
OD_K2POWER_DIOPTERS: 43.38
OS_AXISANGLE_DEGREES: 017

## 2025-07-31 ENCOUNTER — APPOINTMENT (OUTPATIENT)
Dept: CARDIOLOGY | Facility: CLINIC | Age: 80
End: 2025-07-31
Payer: MEDICARE

## 2025-07-31 VITALS
WEIGHT: 195 LBS | BODY MASS INDEX: 31.34 KG/M2 | SYSTOLIC BLOOD PRESSURE: 110 MMHG | DIASTOLIC BLOOD PRESSURE: 58 MMHG | HEART RATE: 87 BPM | HEIGHT: 66 IN

## 2025-07-31 DIAGNOSIS — Z98.890 OTHER SPECIFIED POSTPROCEDURAL STATES: ICD-10-CM

## 2025-07-31 DIAGNOSIS — I48.91 UNSPECIFIED ATRIAL FIBRILLATION: ICD-10-CM

## 2025-07-31 DIAGNOSIS — I50.30 UNSPECIFIED DIASTOLIC (CONGESTIVE) HEART FAILURE: ICD-10-CM

## 2025-07-31 DIAGNOSIS — E11.9 TYPE 2 DIABETES MELLITUS W/OUT COMPLICATIONS: ICD-10-CM

## 2025-07-31 DIAGNOSIS — I10 ESSENTIAL (PRIMARY) HYPERTENSION: ICD-10-CM

## 2025-07-31 DIAGNOSIS — I34.0 NONRHEUMATIC MITRAL (VALVE) INSUFFICIENCY: ICD-10-CM

## 2025-07-31 DIAGNOSIS — I49.3 VENTRICULAR PREMATURE DEPOLARIZATION: ICD-10-CM

## 2025-07-31 DIAGNOSIS — Z86.79 OTHER SPECIFIED POSTPROCEDURAL STATES: ICD-10-CM

## 2025-07-31 PROCEDURE — 99214 OFFICE O/P EST MOD 30 MIN: CPT

## 2025-07-31 PROCEDURE — 93000 ELECTROCARDIOGRAM COMPLETE: CPT

## 2025-07-31 PROCEDURE — G2211 COMPLEX E/M VISIT ADD ON: CPT

## 2025-08-04 PROCEDURE — 93244 EXT ECG>48HR<7D REV&INTERPJ: CPT

## 2025-08-05 ENCOUNTER — OFFICE (OUTPATIENT)
Dept: URBAN - METROPOLITAN AREA CLINIC 94 | Facility: CLINIC | Age: 80
Setting detail: OPHTHALMOLOGY
End: 2025-08-05
Payer: COMMERCIAL

## 2025-08-05 DIAGNOSIS — E11.3511: ICD-10-CM

## 2025-08-05 DIAGNOSIS — E11.3513: ICD-10-CM

## 2025-08-05 PROCEDURE — 92134 CPTRZ OPH DX IMG PST SGM RTA: CPT | Performed by: OPHTHALMOLOGY

## 2025-08-05 PROCEDURE — 92235 FLUORESCEIN ANGRPH MLTIFRAME: CPT | Performed by: OPHTHALMOLOGY

## 2025-08-05 PROCEDURE — 67210 TREATMENT OF RETINAL LESION: CPT | Mod: 79,RT | Performed by: OPHTHALMOLOGY

## 2025-08-05 ASSESSMENT — REFRACTION_AUTOREFRACTION
OS_CYLINDER: -1.75
OS_SPHERE: +1.25
OD_SPHERE: +0.50
OD_CYLINDER: -1.00
OS_AXIS: 102
OD_AXIS: 083

## 2025-08-05 ASSESSMENT — TONOMETRY
OS_IOP_MMHG: 15
OD_IOP_MMHG: 17

## 2025-08-05 ASSESSMENT — KERATOMETRY
OD_AXISANGLE_DEGREES: 128
OD_K1POWER_DIOPTERS: 43.16
OS_AXISANGLE_DEGREES: 017
OS_K2POWER_DIOPTERS: 44.00
OD_K2POWER_DIOPTERS: 43.38
OS_K1POWER_DIOPTERS: 42.08

## 2025-08-05 ASSESSMENT — VISUAL ACUITY
OD_BCVA: 20/20-1
OS_BCVA: 20/50

## 2025-08-05 ASSESSMENT — CONFRONTATIONAL VISUAL FIELD TEST (CVF)
OD_FINDINGS: FULL
OS_FINDINGS: FULL

## 2025-08-20 ENCOUNTER — APPOINTMENT (OUTPATIENT)
Dept: ELECTROPHYSIOLOGY | Facility: CLINIC | Age: 80
End: 2025-08-20
Payer: MEDICARE

## 2025-08-20 VITALS
WEIGHT: 194 LBS | HEART RATE: 76 BPM | SYSTOLIC BLOOD PRESSURE: 139 MMHG | OXYGEN SATURATION: 95 % | DIASTOLIC BLOOD PRESSURE: 76 MMHG | BODY MASS INDEX: 31.18 KG/M2 | HEIGHT: 66 IN

## 2025-08-20 DIAGNOSIS — Z86.79 OTHER SPECIFIED POSTPROCEDURAL STATES: ICD-10-CM

## 2025-08-20 DIAGNOSIS — I48.91 UNSPECIFIED ATRIAL FIBRILLATION: ICD-10-CM

## 2025-08-20 DIAGNOSIS — Z98.890 OTHER SPECIFIED POSTPROCEDURAL STATES: ICD-10-CM

## 2025-08-20 PROCEDURE — 93000 ELECTROCARDIOGRAM COMPLETE: CPT | Mod: 59

## 2025-08-20 PROCEDURE — 99214 OFFICE O/P EST MOD 30 MIN: CPT | Mod: 25
